# Patient Record
Sex: FEMALE | Race: WHITE | NOT HISPANIC OR LATINO | Employment: FULL TIME | URBAN - METROPOLITAN AREA
[De-identification: names, ages, dates, MRNs, and addresses within clinical notes are randomized per-mention and may not be internally consistent; named-entity substitution may affect disease eponyms.]

---

## 2018-07-04 ENCOUNTER — HOSPITAL ENCOUNTER (OUTPATIENT)
Dept: RADIOLOGY | Facility: MEDICAL CENTER | Age: 28
End: 2018-07-04

## 2018-07-04 ENCOUNTER — HOSPITAL ENCOUNTER (INPATIENT)
Facility: MEDICAL CENTER | Age: 28
LOS: 1 days | DRG: 153 | End: 2018-07-05
Attending: EMERGENCY MEDICINE | Admitting: INTERNAL MEDICINE
Payer: COMMERCIAL

## 2018-07-04 ENCOUNTER — APPOINTMENT (OUTPATIENT)
Dept: RADIOLOGY | Facility: MEDICAL CENTER | Age: 28
DRG: 153 | End: 2018-07-04
Attending: INTERNAL MEDICINE
Payer: COMMERCIAL

## 2018-07-04 PROBLEM — J36 PERITONSILLAR ABSCESS: Status: ACTIVE | Noted: 2018-07-04

## 2018-07-04 PROCEDURE — 99285 EMERGENCY DEPT VISIT HI MDM: CPT

## 2018-07-04 PROCEDURE — 700105 HCHG RX REV CODE 258: Performed by: EMERGENCY MEDICINE

## 2018-07-04 PROCEDURE — G0378 HOSPITAL OBSERVATION PER HR: HCPCS

## 2018-07-04 PROCEDURE — 700111 HCHG RX REV CODE 636 W/ 250 OVERRIDE (IP): Performed by: INTERNAL MEDICINE

## 2018-07-04 PROCEDURE — A9270 NON-COVERED ITEM OR SERVICE: HCPCS | Performed by: INTERNAL MEDICINE

## 2018-07-04 PROCEDURE — 36415 COLL VENOUS BLD VENIPUNCTURE: CPT

## 2018-07-04 PROCEDURE — 87040 BLOOD CULTURE FOR BACTERIA: CPT | Mod: 91

## 2018-07-04 PROCEDURE — 700111 HCHG RX REV CODE 636 W/ 250 OVERRIDE (IP): Performed by: EMERGENCY MEDICINE

## 2018-07-04 PROCEDURE — 10160 PNXR ASPIR ABSC HMTMA BULLA: CPT

## 2018-07-04 PROCEDURE — 96375 TX/PRO/DX INJ NEW DRUG ADDON: CPT

## 2018-07-04 PROCEDURE — 71045 X-RAY EXAM CHEST 1 VIEW: CPT

## 2018-07-04 PROCEDURE — 700101 HCHG RX REV CODE 250: Performed by: INTERNAL MEDICINE

## 2018-07-04 PROCEDURE — 700105 HCHG RX REV CODE 258: Performed by: INTERNAL MEDICINE

## 2018-07-04 PROCEDURE — 96374 THER/PROPH/DIAG INJ IV PUSH: CPT

## 2018-07-04 PROCEDURE — 700102 HCHG RX REV CODE 250 W/ 637 OVERRIDE(OP): Performed by: INTERNAL MEDICINE

## 2018-07-04 RX ORDER — CLINDAMYCIN PHOSPHATE 600 MG/50ML
600 INJECTION, SOLUTION INTRAVENOUS EVERY 8 HOURS
Status: DISCONTINUED | OUTPATIENT
Start: 2018-07-04 | End: 2018-07-05

## 2018-07-04 RX ORDER — IBUPROFEN 200 MG
800 TABLET ORAL EVERY 6 HOURS PRN
Status: ON HOLD | COMMUNITY
End: 2018-07-05

## 2018-07-04 RX ORDER — POLYETHYLENE GLYCOL 3350 17 G/17G
1 POWDER, FOR SOLUTION ORAL
Status: DISCONTINUED | OUTPATIENT
Start: 2018-07-04 | End: 2018-07-05 | Stop reason: HOSPADM

## 2018-07-04 RX ORDER — BISACODYL 10 MG
10 SUPPOSITORY, RECTAL RECTAL
Status: DISCONTINUED | OUTPATIENT
Start: 2018-07-04 | End: 2018-07-05 | Stop reason: HOSPADM

## 2018-07-04 RX ORDER — DEXAMETHASONE SODIUM PHOSPHATE 10 MG/ML
10 INJECTION, SOLUTION INTRAMUSCULAR; INTRAVENOUS ONCE
Status: COMPLETED | OUTPATIENT
Start: 2018-07-04 | End: 2018-07-04

## 2018-07-04 RX ORDER — SODIUM CHLORIDE 9 MG/ML
1000 INJECTION, SOLUTION INTRAVENOUS ONCE
Status: COMPLETED | OUTPATIENT
Start: 2018-07-04 | End: 2018-07-04

## 2018-07-04 RX ORDER — AMOXICILLIN 250 MG
2 CAPSULE ORAL 2 TIMES DAILY
Status: DISCONTINUED | OUTPATIENT
Start: 2018-07-04 | End: 2018-07-05 | Stop reason: HOSPADM

## 2018-07-04 RX ORDER — ONDANSETRON 4 MG/1
4 TABLET, ORALLY DISINTEGRATING ORAL EVERY 4 HOURS PRN
Status: DISCONTINUED | OUTPATIENT
Start: 2018-07-04 | End: 2018-07-05 | Stop reason: HOSPADM

## 2018-07-04 RX ORDER — PROMETHAZINE HYDROCHLORIDE 25 MG/1
12.5-25 TABLET ORAL EVERY 4 HOURS PRN
Status: DISCONTINUED | OUTPATIENT
Start: 2018-07-04 | End: 2018-07-05 | Stop reason: HOSPADM

## 2018-07-04 RX ORDER — AZITHROMYCIN 250 MG/1
250-500 TABLET, FILM COATED ORAL DAILY
Status: ON HOLD | COMMUNITY
Start: 2018-07-02 | End: 2018-07-05

## 2018-07-04 RX ORDER — MORPHINE SULFATE 4 MG/ML
4 INJECTION, SOLUTION INTRAMUSCULAR; INTRAVENOUS ONCE
Status: COMPLETED | OUTPATIENT
Start: 2018-07-04 | End: 2018-07-04

## 2018-07-04 RX ORDER — ACETAMINOPHEN 325 MG/1
650 TABLET ORAL EVERY 6 HOURS PRN
COMMUNITY

## 2018-07-04 RX ORDER — MORPHINE SULFATE 4 MG/ML
2 INJECTION, SOLUTION INTRAMUSCULAR; INTRAVENOUS
Status: DISCONTINUED | OUTPATIENT
Start: 2018-07-04 | End: 2018-07-05 | Stop reason: HOSPADM

## 2018-07-04 RX ORDER — PROMETHAZINE HYDROCHLORIDE 25 MG/1
12.5-25 SUPPOSITORY RECTAL EVERY 4 HOURS PRN
Status: DISCONTINUED | OUTPATIENT
Start: 2018-07-04 | End: 2018-07-05 | Stop reason: HOSPADM

## 2018-07-04 RX ORDER — DEXAMETHASONE SODIUM PHOSPHATE 4 MG/ML
10 INJECTION, SOLUTION INTRA-ARTICULAR; INTRALESIONAL; INTRAMUSCULAR; INTRAVENOUS; SOFT TISSUE EVERY 8 HOURS
Status: DISCONTINUED | OUTPATIENT
Start: 2018-07-04 | End: 2018-07-05

## 2018-07-04 RX ORDER — SODIUM CHLORIDE 9 MG/ML
INJECTION, SOLUTION INTRAVENOUS CONTINUOUS
Status: DISCONTINUED | OUTPATIENT
Start: 2018-07-04 | End: 2018-07-05 | Stop reason: HOSPADM

## 2018-07-04 RX ORDER — CLINDAMYCIN HYDROCHLORIDE 150 MG/1
150 CAPSULE ORAL 4 TIMES DAILY
Status: ON HOLD | COMMUNITY
Start: 2018-07-02 | End: 2018-07-05

## 2018-07-04 RX ORDER — ONDANSETRON 2 MG/ML
4 INJECTION INTRAMUSCULAR; INTRAVENOUS EVERY 4 HOURS PRN
Status: DISCONTINUED | OUTPATIENT
Start: 2018-07-04 | End: 2018-07-05 | Stop reason: HOSPADM

## 2018-07-04 RX ORDER — ALBUTEROL SULFATE 90 UG/1
2 AEROSOL, METERED RESPIRATORY (INHALATION) EVERY 6 HOURS PRN
Status: ON HOLD | COMMUNITY
End: 2018-07-05

## 2018-07-04 RX ORDER — ONDANSETRON 2 MG/ML
4 INJECTION INTRAMUSCULAR; INTRAVENOUS ONCE
Status: COMPLETED | OUTPATIENT
Start: 2018-07-04 | End: 2018-07-04

## 2018-07-04 RX ADMIN — ONDANSETRON 4 MG: 2 INJECTION, SOLUTION INTRAMUSCULAR; INTRAVENOUS at 14:31

## 2018-07-04 RX ADMIN — DEXAMETHASONE SODIUM PHOSPHATE 10 MG: 4 INJECTION, SOLUTION INTRAMUSCULAR; INTRAVENOUS at 22:22

## 2018-07-04 RX ADMIN — SODIUM CHLORIDE 1000 ML: 9 INJECTION, SOLUTION INTRAVENOUS at 14:30

## 2018-07-04 RX ADMIN — MORPHINE SULFATE 4 MG: 4 INJECTION INTRAVENOUS at 14:30

## 2018-07-04 RX ADMIN — MORPHINE SULFATE 2 MG: 4 INJECTION INTRAVENOUS at 22:21

## 2018-07-04 RX ADMIN — ONDANSETRON 4 MG: 2 INJECTION, SOLUTION INTRAMUSCULAR; INTRAVENOUS at 22:25

## 2018-07-04 RX ADMIN — DEXAMETHASONE SODIUM PHOSPHATE 10 MG: 10 INJECTION, SOLUTION INTRAMUSCULAR; INTRAVENOUS at 14:30

## 2018-07-04 RX ADMIN — CLINDAMYCIN IN 5 PERCENT DEXTROSE 600 MG: 12 INJECTION, SOLUTION INTRAVENOUS at 20:57

## 2018-07-04 RX ADMIN — SODIUM CHLORIDE: 9 INJECTION, SOLUTION INTRAVENOUS at 17:52

## 2018-07-04 RX ADMIN — STANDARDIZED SENNA CONCENTRATE AND DOCUSATE SODIUM 2 TABLET: 8.6; 5 TABLET, FILM COATED ORAL at 20:57

## 2018-07-04 ASSESSMENT — COPD QUESTIONNAIRES
IN THE PAST 12 MONTHS DO YOU DO LESS THAN YOU USED TO BECAUSE OF YOUR BREATHING PROBLEMS: DISAGREE/UNSURE
DO YOU EVER COUGH UP ANY MUCUS OR PHLEGM?: NO/ONLY WITH OCCASIONAL COLDS OR INFECTIONS
HAVE YOU SMOKED AT LEAST 100 CIGARETTES IN YOUR ENTIRE LIFE: NO/DON'T KNOW
DURING THE PAST 4 WEEKS HOW MUCH DID YOU FEEL SHORT OF BREATH: NONE/LITTLE OF THE TIME
COPD SCREENING SCORE: 0

## 2018-07-04 ASSESSMENT — ENCOUNTER SYMPTOMS
NEUROLOGICAL NEGATIVE: 1
MUSCULOSKELETAL NEGATIVE: 1
STRIDOR: 0
SORE THROAT: 1
DIAPHORESIS: 0
CARDIOVASCULAR NEGATIVE: 1
WHEEZING: 0
CHILLS: 1
DIARRHEA: 0
ABDOMINAL PAIN: 0
VOMITING: 0
NAUSEA: 0
PSYCHIATRIC NEGATIVE: 1
FEVER: 0
SHORTNESS OF BREATH: 0
EYE REDNESS: 0
COUGH: 1
EYE DISCHARGE: 0
SINUS PAIN: 0

## 2018-07-04 ASSESSMENT — COGNITIVE AND FUNCTIONAL STATUS - GENERAL
MOBILITY SCORE: 24
DAILY ACTIVITIY SCORE: 24
SUGGESTED CMS G CODE MODIFIER DAILY ACTIVITY: CH
SUGGESTED CMS G CODE MODIFIER MOBILITY: CH

## 2018-07-04 ASSESSMENT — PATIENT HEALTH QUESTIONNAIRE - PHQ9
SUM OF ALL RESPONSES TO PHQ9 QUESTIONS 1 AND 2: 0
2. FEELING DOWN, DEPRESSED, IRRITABLE, OR HOPELESS: NOT AT ALL
1. LITTLE INTEREST OR PLEASURE IN DOING THINGS: NOT AT ALL

## 2018-07-04 ASSESSMENT — PAIN SCALES - GENERAL: PAINLEVEL_OUTOF10: 4

## 2018-07-04 ASSESSMENT — LIFESTYLE VARIABLES: EVER_SMOKED: NEVER

## 2018-07-04 NOTE — ED NOTES
Break RN: Patient awaiting ENT Consult. Updated POC with patient and family. ERP ok patient to breast feed. VSS at this time.

## 2018-07-04 NOTE — ED PROVIDER NOTES
ED Provider Note    Scribed for Dr. Matias Carrasco M.D. by Margaret Archer. 7/4/2018  12:57 PM    Primary care provider: ORTIZ Maravilla M.D.  Means of arrival: EMS  History obtained from: Patient   History limited by: none    CHIEF COMPLAINT  Chief Complaint   Patient presents with   • Oral Swelling     transferred from [place] for peritonsillar abcess    • Difficulty Swallowing       HPI  Jeanne Juarez is a 27 y.o. female who presents to the Emergency Department by EMS from [facility] for peritonsillar abscess. Patient had a CT preformed today which revealed the peritonsillar abscess. She was treated with 600mg Clindamycin, 15mg Toradol, and 1L NS fluid at outside hospital. Patient has been experiencing a sore throat for two weeks. Her symptoms are exacerbated with swallowing. Patient denies alleviating factors. She began her course of antibiotics three days ago. She is unable to tolerate oral secretions at this time. Patient denies any fever, vomiting, or abdominal pain associated. She has allergies to Amoxicillin, Penicillin, Keflex, and Cephalexin. The patient denies any past pertinent medical history, and use of daily medications.        REVIEW OF SYSTEMS  Pertinent positives include peritonsillar abscess, unable to tolerate oral secretions, sore throat. Pertinent negatives include no  fever, vomiting, or abdominal pain . As above, all other systems reviewed and are negative.   See HPI for further details.   C.    PAST MEDICAL HISTORY    denies any past pertinent medical history    SURGICAL HISTORY  patient denies any surgical history    SOCIAL HISTORY  Social History   Substance Use Topics   • Smoking status: Not on file   • Smokeless tobacco: Not on file   • Alcohol use Not on file      History   Drug use: Unknown       FAMILY HISTORY  No family history noted    CURRENT MEDICATIONS  Clindamycin     ALLERGIES  Allergies   Allergen Reactions   • Amoxicillin    • Cephalexin    • Keflex    •  "Penicillins        PHYSICAL EXAM  VITAL SIGNS: /74   Pulse 67   Temp 37.2 °C (98.9 °F)   Resp 15   Ht 1.727 m (5' 8\")   Wt 54.5 kg (120 lb 4.2 oz)   SpO2 96%   BMI 18.29 kg/m²     Constitutional: Well developed, Well nourished, mild distress secondary to pain, Non-toxic appearance. Unable to tolerate oral secretions.   HENT: Normocephalic, Atraumatic, Bilateral external ears normal, Oropharynx is dry, left sided swelling, she is unable to open her mouth very wide and I am unable to see direction of abscess.   Eyes: PERRLA, EOMI, Conjunctiva normal, No discharge.   Neck: No tenderness, Supple, No stridor.   Lymphatic: No lymphadenopathy noted.   Cardiovascular: Normal heart rate, Normal rhythm.   Thorax & Lungs: Clear to auscultation bilaterally, No respiratory distress, No wheezing, No crackles.   Abdomen: Soft, No tenderness, No masses, No pulsatile masses.   Skin: Warm, Dry, No erythema, No rash.   Extremities:, No edema No cyanosis.   Musculoskeletal: No tenderness to palpation or major deformities noted.  Intact distal pulses  Neurologic: Awake, alert. Moves all extremities spontaneously.  Psychiatric: Affect normal, Judgment normal, Mood normal.       RADIOLOGY  CT-FOREIGN FILM CAT SCAN   Final Result        The radiologist's interpretation of all radiological studies have been reviewed by me.      COURSE & MEDICAL DECISION MAKING  Pertinent Labs & Imaging studies reviewed. (See chart for details)    12:57 PM - Patient seen and examined at bedside. I discussed with patient that I will consult with ENT for further plan of treatment.     1:05 PM Paged ENT.     1:46 PM Recheck: Patient re-evaluated at beside. Her symptoms are unchanged at this time.     2:04 PM - I discussed the patient's case and the above findings with Dr. Meng (ENT) who agrees to consult.     2:22 PM - I discussed the patient's case and the above findings with pharmacy who advises I treat with 10mg decadron and IV fluids for dry " mucus membranes .    3:28 PM Recheck: Patient re-evaluated at beside. Patient reports feeling improved after IV fluids, her mucus membranes have improved. Discussed patient's condition and treatment plan including incision and drainage which she agrees to.     3:32 PM I was unable to obtain drainage other than blood from abscess. Paged ENT.     3:41 PM - I discussed the patient's case and the above findings with Dr. Meng (ENT) who ***          Decision Making:  ***    {.EMSSDCNOTE or .EMSSADMITNOTE}    FINAL IMPRESSION  No diagnosis found.      IMargaret (Scribe), am scribing for, and in the presence of, Matias Carrasco M.D..    Electronically signed by: Margaret Archer (Scribe), 7/4/2018    Matias COLLIER M.D. personally performed the services described in this documentation, as scribed by Margaret Archer in my presence, and it is both accurate and complete.    {ERP Attestation (ERP ONLY):200109}

## 2018-07-04 NOTE — H&P
"      Internal Medicine Admitting History and Physical    Note Author: Madyson Merino M.D.       Name Jeanne Juarez 1990   Age/Sex 27 y.o. female   MRN [id number]   Code Status Full     After 5PM or if no immediate response to page, please call for cross-coverage  Attending/Team: Dr. Luna/BRITANY REd See Patient List for primary contact information  Call [phone] to page    1st Call - Day Intern (R1):   Dr Merino 2nd Call - Day Sr. Resident (R2/R3):   Dr Cedillo       Chief Complaint:   Tonsillar abcess    HPI:  Pt starting have cold symptoms two weeks ago with sinus congestion and sore throat. She was not improving so Monday of this week, pt went to urgent care in [place] and was prescribed \"two Z packs\" on Monday and then \"one Z pack\" Tuesday morning. She did not continue abx after Tuesday. She was not improving so went to [facility] in [place]. Received IV clindamycin in ER but was not discharged on oral abx. Pt felt her throat pain was worsening and had difficulty swallowing. Three days ago pt felt significantly worse and felt like she had difficulty breathing so returned to ER in [place] and got another dose of IV clindamycin. Pt was discharged from ER and told she had cellulitis and pt decided to drive herself to [facility]. Pt endorses sporadic chills, dysphagia, cough, and hoarseness. Denies sick contacts, fever, diaphoresis, and diarrhea. Pt is fully vaccinated.     She has had two oral abscesses in the past, both within the past two years. Pt was very ill with one and had to be hospitalized in [place] due to having difficulty breathing from compression of her airway. She was given steroids and two antibiotics but she cannot recall which.     In ER pt was given 1 L NS, one dose decadron, and was started on clindamycin. In ER a left tonsillar abscess was visualized and they attempted to drain it. Unsure if it produced any pus.     Review of Systems "   Constitutional: Positive for chills and malaise/fatigue. Negative for diaphoresis and fever.   HENT: Positive for sore throat. Negative for sinus pain.    Eyes: Negative for discharge and redness.   Respiratory: Positive for cough. Negative for shortness of breath, wheezing and stridor.    Cardiovascular: Negative.    Gastrointestinal: Negative for abdominal pain, diarrhea, nausea and vomiting.   Genitourinary: Negative.    Musculoskeletal: Negative.    Skin: Negative for rash.   Neurological: Negative.    Endo/Heme/Allergies: Negative.    Psychiatric/Behavioral: Negative.              Past Medical History (Chronic medical problem, known complications and current treatment)    Dental abscesses x2     Past Surgical History:  Past Surgical History:   Procedure Laterality Date   • TUBAL LIGATION  2017       Current Outpatient Medications:  Home Medications     Reviewed by Prerna Sands (Pharmacy Tech) on 07/04/18 at 1634  Med List Status: Complete   Medication Last Dose Status   acetaminophen (TYLENOL) 500 MG Tab > 2 days Active   albuterol 108 (90 Base) MCG/ACT Aero Soln inhalation aerosol 7/1/2018 Active   azithromycin (ZITHROMAX) 250 MG Tab 7/4/2018 Active   clindamycin (CLEOCIN) 150 MG Cap 7/4/2018 Active   ibuprofen (MOTRIN) 200 MG Tab 7/4/2018 Active   Non Formulary Request > 2 days Active   Non Formulary Request > 2 days Active   Non Formulary Request > 2 days Active                Medication Allergy/Sensitivities:  Allergies   Allergen Reactions   • Amoxicillin    • Cephalexin    • Keflex    • Penicillins          Family History (mandatory)   Family History   Problem Relation Age of Onset   • Breast Cancer Maternal Grandmother    • Diabetes Maternal Grandfather    • Cancer Paternal Grandfather        Social History (mandatory)   Social History     Social History   • Marital status: Single     Spouse name: N/A   • Number of children: N/A   • Years of education: N/A     Occupational History   • Not on  "file.     Social History Main Topics   • Smoking status: Not on file   • Smokeless tobacco: Not on file   • Alcohol use Not on file   • Drug use: Unknown   • Sexual activity: Not on file     Other Topics Concern   • Not on file     Social History Narrative   • No narrative on file     Living situation: Lives with three children and her children's father in [place]  PCP : ORTIZ Maravilla M.D.    Physical Exam     Vitals:    07/04/18 1400 07/04/18 1430 07/04/18 1526 07/04/18 1600   BP:       Pulse: 73 81 92 89   Resp:       Temp:       SpO2: 98% 97% 96% 94%   Weight:       Height:         Body mass index is 18.29 kg/m².  /74   Pulse 89   Temp 37.2 °C (98.9 °F)   Resp 15   Ht 1.727 m (5' 8\")   Wt 54.5 kg (120 lb 4.2 oz)   SpO2 94%   BMI 18.29 kg/m²   O2 therapy: Pulse Oximetry: 94 %    Physical Exam   Constitutional: She is oriented to person, place, and time and well-developed, well-nourished, and in no distress.   Bag with blood that pt is spitting into on bed   HENT:   Head: Normocephalic and atraumatic.   Swelling of the left submandibular area, tenderness, no erythema of the skin  Left tonsil is erythematous and bleeding  mallampati 3  No LAD  No sinus tenderness   Neck: Neck supple. No tracheal deviation present.   Cardiovascular: Normal rate, regular rhythm and normal heart sounds.    Pulmonary/Chest: Effort normal. No stridor. No respiratory distress. She has no wheezes. She has no rales.   Decreased breath sounds RLL   Abdominal: Soft. Bowel sounds are normal. She exhibits no distension. There is no tenderness.   Musculoskeletal:   Capillary refill +2   Lymphadenopathy:     She has no cervical adenopathy.   Neurological: She is alert and oriented to person, place, and time. GCS score is 15.   Skin: Skin is warm and dry.   Nursing note and vitals reviewed.        Data Review       Old Records Request:  Deffered  Current Records review/summary: No current records    Lab Data Review:  No " results found for this or any previous visit (from the past 24 hour(s)).    Imaging/Procedures Review:    Independant Imaging Review: completed  CT-FOREIGN FILM CAT SCAN   Final Result      DX-CHEST-PORTABLE (1 VIEW)    (Results Pending)            EKG:   EKG Independant Review: no EKG    Records reviewed and summarized in current documentation : No records available  [employer] teaching service handout given to patient: Yes         Assessment/Plan     Peritonsillar abscess   Assessment & Plan    -pain for two weeks  -previously treated with azithromycin, unknown amount, possibly two days  -prevsiously treated with two IV doses of clindamycin, last this AM in [place] [facility]  -no issues breathing currently, pain and difficulty swallowing per pt    Plan:  -started decadron q 8 hrs  -started clindamycin q 8 hrs  - IV fluids 100 ml/hr  -liquid diet  -ENT has been contacted            Anticipated Hospital stay: 1 day        Quality Measures  Quality-Core Measures  PCP: ORTIZ Maravilla M.D.

## 2018-07-04 NOTE — ED TRIAGE NOTES
Seen at [place] this morning, CT shows peritonsillar abscess.  Patient received 600mg clindamycin, 15mg toradol, and 1L NS.  Vitals stable, maintaining airway.  Unable to swallow at this time.

## 2018-07-04 NOTE — ED PROVIDER NOTES
ED Provider Note    Scribed for Dr. Matias Carrasco M.D. by Margaret Archer. 7/4/2018  12:57 PM    Primary care provider: ORTIZ Maravilla M.D.  Means of arrival: EMS  History obtained from: Patient   History limited by: none    CHIEF COMPLAINT  Chief Complaint   Patient presents with   • Oral Swelling     transferred from [place] for peritonsillar abcess    • Difficulty Swallowing       HPI  Jeanne Juarez is a 27 y.o. female who presents to the Emergency Department by EMS from [facility] for peritonsillar abscess. Patient had a CT preformed today which revealed the peritonsillar abscess. She was treated with 600mg Clindamycin, 15mg Toradol, and 1L NS fluid at outside hospital. Patient has been experiencing a sore throat for two weeks. Her symptoms are exacerbated with swallowing. Patient denies alleviating factors. She began her course of antibiotics three days ago. She is unable to tolerate oral secretions at this time. Patient denies any fever, vomiting, or abdominal pain associated. She has allergies to Amoxicillin, Penicillin, Keflex, and Cephalexin. The patient denies any past pertinent medical history, and use of daily medications.        REVIEW OF SYSTEMS  Pertinent positives include peritonsillar abscess, unable to tolerate oral secretions, sore throat. Pertinent negatives include no  fever, vomiting, or abdominal pain . As above, all other systems reviewed and are negative.   See HPI for further details.   C.    PAST MEDICAL HISTORY   has a past medical history of Dental abscess (2016); Hip fracture (HCC); and Pregnancy. denies any past pertinent medical history    SURGICAL HISTORY   has a past surgical history that includes tubal ligation (2017).    SOCIAL HISTORY     No social history noted.     FAMILY HISTORY  No family history noted    CURRENT MEDICATIONS  Clindamycin     ALLERGIES  Allergies   Allergen Reactions   • Amoxicillin    • Cephalexin    • Keflex    • Penicillins   "      PHYSICAL EXAM  VITAL SIGNS: /74   Pulse 67   Temp 37.2 °C (98.9 °F)   Resp 15   Ht 1.727 m (5' 8\")   Wt 54.5 kg (120 lb 4.2 oz)   SpO2 96%   BMI 18.29 kg/m²     Constitutional: Well developed, Well nourished, mild distress secondary to pain, Non-toxic appearance. Unable to tolerate oral secretions.   HENT: Normocephalic, Atraumatic, Bilateral external ears normal, Oropharynx is dry, left sided swelling, she is unable to open her mouth very wide and I am unable to see direction of abscess. After some pain medication I can see much better she is cooperative and attempted drainage as below  Eyes: PERRLA, EOMI, Conjunctiva normal, No discharge.   Neck: No tenderness, Supple, No stridor.   Lymphatic: No lymphadenopathy noted.   Cardiovascular: Normal heart rate, Normal rhythm.   Thorax & Lungs: Clear to auscultation bilaterally, No respiratory distress, No wheezing, No crackles.   Abdomen: Soft, No tenderness, No masses, No pulsatile masses.   Skin: Warm, Dry, No erythema, No rash.   Extremities:, No edema No cyanosis.   Musculoskeletal: No tenderness to palpation or major deformities noted.  Intact distal pulses  Neurologic: Awake, alert. Moves all extremities spontaneously.  Psychiatric: Affect normal, Judgment normal, Mood normal.       RADIOLOGY  DX-CHEST-PORTABLE (1 VIEW)   Final Result      No acute cardiopulmonary disease.      CT-FOREIGN FILM CAT SCAN   Final Result        The radiologist's interpretation of all radiological studies have been reviewed by me.  Procedure note: The patient more cooperative after pain medication unable to open her mouth wider I did attempt aspiration of peritonsillar abscess although did not obtain any purulent material there was some bleeding and the patient feels significantly improved after drainage attempt             COURSE & MEDICAL DECISION MAKING  Pertinent Labs & Imaging studies reviewed. (See chart for details)    12:57 PM - Patient seen and examined at " bedside. I discussed with patient that I will consult with ENT for further plan of treatment.     1:05 PM Paged ENT.     1:46 PM Recheck: Patient re-evaluated at [facility]. Her symptoms are unchanged at this time.     2:04 PM - I discussed the patient's case and the above findings with Dr. Meng (ENT)      2:22 PM - I discussed the patient's case and the above findings with pharmacy who advises I treat with 10mg decadron and IV fluids for dry mucus membranes .    3:28 PM Recheck: Patient re-evaluated at [facility]. Patient reports feeling improved after IV fluids, her mucus membranes have improved. Discussed patient's condition and treatment plan including incision and drainage which she agrees to.     3:32 PM I was unable to obtain drainage other than blood from abscess. Paged ENT.     3:40 PM - I discussed the patient's case and the above findings with Dr. Meng (ENT) who advises I admit patient to hospital service.    3:43 PM Recheck: Patient re-evaluated at [facility]. Patient reports feeling improved, she states she feels less pressure secondary to the abscess. Discussed patient's condition and treatment plan. Patient's lab and radiology results discussed. The patient understood and is in agreement.        3:50 PM - I discussed the patient's case and the above findings with [facility] who agrees to admit the patient.               Decision Making:  Although I was unable to obtain pus the patient has improve after attempted aspiration of peritonsillar abscess will admit her for observation to medicine and keep on IV antibiotics and steroids    DISPOSITION:  Patient will be admitted to [facility] in guarded condition.      FINAL IMPRESSION  Peritonsillar abscess and cellulitis      Margaret COLLIER (Darlene), am scribing for, and in the presence of, Matias Carrasco M.D..    Electronically signed by: Margaret Archer (Darlene), 7/4/2018    Matias COLLIER M.D. personally performed the  services described in this documentation, as scribed by Margaret Archer in my presence, and it is both accurate and complete.    The note accurately reflects work and decisions made by me.  Matias Carrasco  7/4/2018  7:16 PM

## 2018-07-04 NOTE — ED NOTES
Med rec updated and complete  Allergies reviewed  Interviewed pt with family at bedside with permission from pt.

## 2018-07-05 ENCOUNTER — APPOINTMENT (OUTPATIENT)
Dept: RADIOLOGY | Facility: MEDICAL CENTER | Age: 28
DRG: 153 | End: 2018-07-05
Attending: INTERNAL MEDICINE
Payer: COMMERCIAL

## 2018-07-05 VITALS
WEIGHT: 135.8 LBS | HEART RATE: 69 BPM | BODY MASS INDEX: 20.58 KG/M2 | RESPIRATION RATE: 17 BRPM | TEMPERATURE: 98.2 F | HEIGHT: 68 IN | SYSTOLIC BLOOD PRESSURE: 104 MMHG | DIASTOLIC BLOOD PRESSURE: 59 MMHG | OXYGEN SATURATION: 94 %

## 2018-07-05 PROBLEM — R73.01 IMPAIRED FASTING GLUCOSE: Status: ACTIVE | Noted: 2018-07-05

## 2018-07-05 PROBLEM — J39.9 DISORDER OF UPPER AIRWAY: Status: ACTIVE | Noted: 2018-07-05

## 2018-07-05 LAB
ALBUMIN SERPL BCP-MCNC: 3.9 G/DL (ref 3.2–4.9)
ALBUMIN/GLOB SERPL: 1.5 G/DL
ALP SERPL-CCNC: 63 U/L (ref 30–99)
ALT SERPL-CCNC: 9 U/L (ref 2–50)
ANION GAP SERPL CALC-SCNC: 10 MMOL/L (ref 0–11.9)
AST SERPL-CCNC: 10 U/L (ref 12–45)
BASOPHILS # BLD AUTO: 0.2 % (ref 0–1.8)
BASOPHILS # BLD: 0.01 K/UL (ref 0–0.12)
BILIRUB SERPL-MCNC: 0.4 MG/DL (ref 0.1–1.5)
BUN SERPL-MCNC: 11 MG/DL (ref 8–22)
CALCIUM SERPL-MCNC: 9.1 MG/DL (ref 8.5–10.5)
CHLORIDE SERPL-SCNC: 109 MMOL/L (ref 96–112)
CO2 SERPL-SCNC: 20 MMOL/L (ref 20–33)
CREAT SERPL-MCNC: 0.46 MG/DL (ref 0.5–1.4)
EOSINOPHIL # BLD AUTO: 0 K/UL (ref 0–0.51)
EOSINOPHIL NFR BLD: 0 % (ref 0–6.9)
ERYTHROCYTE [DISTWIDTH] IN BLOOD BY AUTOMATED COUNT: 41.9 FL (ref 35.9–50)
GLOBULIN SER CALC-MCNC: 2.6 G/DL (ref 1.9–3.5)
GLUCOSE SERPL-MCNC: 115 MG/DL (ref 65–99)
HCT VFR BLD AUTO: 35.4 % (ref 37–47)
HGB BLD-MCNC: 11.4 G/DL (ref 12–16)
IMM GRANULOCYTES # BLD AUTO: 0.02 K/UL (ref 0–0.11)
IMM GRANULOCYTES NFR BLD AUTO: 0.4 % (ref 0–0.9)
LYMPHOCYTES # BLD AUTO: 0.88 K/UL (ref 1–4.8)
LYMPHOCYTES NFR BLD: 16.1 % (ref 22–41)
MAGNESIUM SERPL-MCNC: 1.9 MG/DL (ref 1.5–2.5)
MCH RBC QN AUTO: 28.9 PG (ref 27–33)
MCHC RBC AUTO-ENTMCNC: 32.2 G/DL (ref 33.6–35)
MCV RBC AUTO: 89.6 FL (ref 81.4–97.8)
MONOCYTES # BLD AUTO: 0.13 K/UL (ref 0–0.85)
MONOCYTES NFR BLD AUTO: 2.4 % (ref 0–13.4)
NEUTROPHILS # BLD AUTO: 4.44 K/UL (ref 2–7.15)
NEUTROPHILS NFR BLD: 80.9 % (ref 44–72)
NRBC # BLD AUTO: 0 K/UL
NRBC BLD-RTO: 0 /100 WBC
PLATELET # BLD AUTO: 217 K/UL (ref 164–446)
PMV BLD AUTO: 10.5 FL (ref 9–12.9)
POTASSIUM SERPL-SCNC: 4.1 MMOL/L (ref 3.6–5.5)
PROT SERPL-MCNC: 6.5 G/DL (ref 6–8.2)
RBC # BLD AUTO: 3.95 M/UL (ref 4.2–5.4)
SODIUM SERPL-SCNC: 139 MMOL/L (ref 135–145)
WBC # BLD AUTO: 5.5 K/UL (ref 4.8–10.8)

## 2018-07-05 PROCEDURE — 700111 HCHG RX REV CODE 636 W/ 250 OVERRIDE (IP): Performed by: INTERNAL MEDICINE

## 2018-07-05 PROCEDURE — 83735 ASSAY OF MAGNESIUM: CPT

## 2018-07-05 PROCEDURE — G0378 HOSPITAL OBSERVATION PER HR: HCPCS

## 2018-07-05 PROCEDURE — 85025 COMPLETE CBC W/AUTO DIFF WBC: CPT

## 2018-07-05 PROCEDURE — 93971 EXTREMITY STUDY: CPT

## 2018-07-05 PROCEDURE — 80053 COMPREHEN METABOLIC PANEL: CPT

## 2018-07-05 PROCEDURE — 36415 COLL VENOUS BLD VENIPUNCTURE: CPT

## 2018-07-05 PROCEDURE — 700101 HCHG RX REV CODE 250: Performed by: INTERNAL MEDICINE

## 2018-07-05 PROCEDURE — 770006 HCHG ROOM/CARE - MED/SURG/GYN SEMI*

## 2018-07-05 PROCEDURE — 99223 1ST HOSP IP/OBS HIGH 75: CPT | Mod: GC | Performed by: INTERNAL MEDICINE

## 2018-07-05 PROCEDURE — 700105 HCHG RX REV CODE 258: Performed by: INTERNAL MEDICINE

## 2018-07-05 PROCEDURE — 700101 HCHG RX REV CODE 250: Performed by: STUDENT IN AN ORGANIZED HEALTH CARE EDUCATION/TRAINING PROGRAM

## 2018-07-05 RX ORDER — METHYLPREDNISOLONE 4 MG/1
TABLET ORAL
Qty: 1 KIT | Refills: 0 | Status: SHIPPED | OUTPATIENT
Start: 2018-07-05 | End: 2023-05-13

## 2018-07-05 RX ORDER — METHYLPREDNISOLONE 4 MG/1
4 TABLET ORAL
Status: DISCONTINUED | OUTPATIENT
Start: 2018-07-05 | End: 2018-07-05 | Stop reason: HOSPADM

## 2018-07-05 RX ORDER — METHYLPREDNISOLONE 4 MG/1
8 TABLET ORAL
Status: DISCONTINUED | OUTPATIENT
Start: 2018-07-06 | End: 2018-07-05 | Stop reason: HOSPADM

## 2018-07-05 RX ORDER — CLINDAMYCIN PHOSPHATE 900 MG/50ML
900 INJECTION, SOLUTION INTRAVENOUS EVERY 8 HOURS
Status: DISCONTINUED | OUTPATIENT
Start: 2018-07-05 | End: 2018-07-05

## 2018-07-05 RX ORDER — METHYLPREDNISOLONE 4 MG/1
4 TABLET ORAL
Status: DISCONTINUED | OUTPATIENT
Start: 2018-07-06 | End: 2018-07-05 | Stop reason: HOSPADM

## 2018-07-05 RX ORDER — LIDOCAINE HYDROCHLORIDE AND EPINEPHRINE 10; 10 MG/ML; UG/ML
10 INJECTION, SOLUTION INFILTRATION; PERINEURAL ONCE
Status: DISCONTINUED | OUTPATIENT
Start: 2018-07-05 | End: 2018-07-05 | Stop reason: HOSPADM

## 2018-07-05 RX ORDER — LIDOCAINE HYDROCHLORIDE AND EPINEPHRINE 10; 10 MG/ML; UG/ML
10 INJECTION, SOLUTION INFILTRATION; PERINEURAL ONCE
Status: DISCONTINUED | OUTPATIENT
Start: 2018-07-05 | End: 2018-07-05

## 2018-07-05 RX ORDER — CLINDAMYCIN HYDROCHLORIDE 150 MG/1
150 CAPSULE ORAL 3 TIMES DAILY
Qty: 30 CAP | Refills: 0 | Status: SHIPPED | OUTPATIENT
Start: 2018-07-05 | End: 2023-05-13

## 2018-07-05 RX ORDER — CLINDAMYCIN HYDROCHLORIDE 150 MG/1
150 CAPSULE ORAL 3 TIMES DAILY
Status: DISCONTINUED | OUTPATIENT
Start: 2018-07-05 | End: 2018-07-05 | Stop reason: HOSPADM

## 2018-07-05 RX ORDER — CLINDAMYCIN PHOSPHATE 600 MG/50ML
600 INJECTION, SOLUTION INTRAVENOUS EVERY 8 HOURS
Status: DISCONTINUED | OUTPATIENT
Start: 2018-07-05 | End: 2018-07-05

## 2018-07-05 RX ORDER — METHYLPREDNISOLONE 4 MG/1
8 TABLET ORAL
Status: DISCONTINUED | OUTPATIENT
Start: 2018-07-05 | End: 2018-07-05 | Stop reason: HOSPADM

## 2018-07-05 RX ORDER — METHYLPREDNISOLONE 4 MG/1
4 TABLET ORAL
Status: DISCONTINUED | OUTPATIENT
Start: 2018-07-07 | End: 2018-07-05 | Stop reason: HOSPADM

## 2018-07-05 RX ADMIN — CLINDAMYCIN IN 5 PERCENT DEXTROSE 600 MG: 12 INJECTION, SOLUTION INTRAVENOUS at 13:59

## 2018-07-05 RX ADMIN — SODIUM CHLORIDE: 9 INJECTION, SOLUTION INTRAVENOUS at 03:34

## 2018-07-05 RX ADMIN — DEXAMETHASONE SODIUM PHOSPHATE 10 MG: 4 INJECTION, SOLUTION INTRAMUSCULAR; INTRAVENOUS at 14:02

## 2018-07-05 RX ADMIN — CLINDAMYCIN IN 5 PERCENT DEXTROSE 600 MG: 12 INJECTION, SOLUTION INTRAVENOUS at 05:47

## 2018-07-05 RX ADMIN — DEXAMETHASONE SODIUM PHOSPHATE 10 MG: 4 INJECTION, SOLUTION INTRAMUSCULAR; INTRAVENOUS at 05:47

## 2018-07-05 ASSESSMENT — PAIN SCALES - GENERAL: PAINLEVEL_OUTOF10: 2

## 2018-07-05 ASSESSMENT — ENCOUNTER SYMPTOMS
HEADACHES: 1
ABDOMINAL PAIN: 0
FEVER: 0
COUGH: 0
DIAPHORESIS: 0
DIZZINESS: 0
NAUSEA: 0
SORE THROAT: 1
CHILLS: 0
DIARRHEA: 0

## 2018-07-05 ASSESSMENT — LIFESTYLE VARIABLES: ALCOHOL_USE: NO

## 2018-07-05 NOTE — H&P
DATE OF SERVICE:  07/04/2018    CHIEF COMPLAINT:  Sore throat, chills, and cough.    HISTORY OF PRESENT ILLNESS:  In brief, this is a very pleasant 27-year-old   female with no significant past medical history, presents to the ER with   complaints of sore throat and sinus congestion.  Symptoms have been ongoing   for about 2 weeks now, and she has been to urgent care twice and two Z-Paks   were prescribed with no improvement in symptoms.  She reports having visited   [facility] in [place] and reports having received IV   clindamycin in the ER, but apparently did not receive any oral antibiotics on   discharge.    Patient has symptoms including dysphagia, hoarseness of voice, and   odynophagia.  Patient reports that 3 days ago she had difficulty breathing and   at that time also been to the ER in [place], but received an albuterol   inhalation with little to no improvement in symptoms.  She, therefore, decided   to drive down to [facility] to get further care.  A CT scan at [facility]   was found to have a peritonsillar abscess on the left side and therefore, she   was sent here for ENT evaluation.    In the ER, she was evaluated by ERP, who treated her with clindamycin,   Toradol, and IV fluids.  The ER physician has contacted ENT, Dr. Johnathan Meng, who advised aspiration which was attempted by the ER physician.  No   drainage was obtained; however, but patient reports that she had some mild   improvement in symptoms after attempted drainage.  On advice of ENT, she is   being admitted for IV antibiotics and we will also place her on steroids to   help with swelling.    PAST MEDICAL HISTORY:  1.  Dental abscesses.  2.  Denies history of diabetes or immunodeficiency conditions.    PAST SURGICAL HISTORY:  Tubectomy.    FAMILY HISTORY:  Maternal grandmother has diabetes mellitus, otherwise no   significant family history.    SOCIAL HISTORY:  Denies smoking, denies alcohol use, and denies  recreational   drug use.    MEDICATIONS:  1.  Tylenol p.r.n.  2.  Ibuprofen p.r.n.  3.  Previously on azithromycin about a week ago, but currently discontinued.  4.  Multivitamins.    ALLERGIES:  ALLERGIC TO PENICILLINS AND CEPHALOSPORINS (ANAPHYLAXIS), AND ALSO   BLUE CHEESE.    REVIEW OF SYSTEMS:  CONSTITUTIONAL:  Denies fevers, reports chills.  HEENT:  Reports congestion, sore throat, cough.  RESPIRATORY:  Reports cough, denies shortness of breath.  CARDIOVASCULAR:  Denies chest pain, palpitations.   ABDOMINAL:  Denies nausea, vomiting, or abdominal pain.  GENITOURINARY:  Denies dysuria, increased frequency.  NEUROLOGICAL:  Denies focal deficits, seizures.    PHYSICAL EXAMINATION:  VITAL SIGNS:  Afebrile, heart rate in the 70s, blood pressure 110s/70s, O2   saturation of 97% on room air.  CONSTITUTIONAL:  Appears comfortable.  HEENT:  Normocephalic, atraumatic.  Throat:  Left-sided swelling visible,   erythematous, no drainage seen.  RESPIRATORY:  Clear to auscultation bilaterally.  CARDIOVASCULAR:  Regular rate and rhythm.  No murmurs, rubs or gallops.  ABDOMEN:  Soft, nontender.  Normal bowel sounds.  EXTREMITIES:  No pedal edema.  NEUROLOGIC:  Alert and oriented x4.    LABORATORY DATA:  WBC count was 6.4, BUN 12, creatinine was 0.8, and CO2 was   25.    IMAGING:  CT soft tissue of the neck shows 1x2x1 cm tonsillar abscess on the   left side and generalized lymphadenopathy on the left side as well.    ASSESSMENT:  This is a 27-year-old female presenting with peritonsillar   abscess.  Most common oral florae are group A strep, Staph and anaerobes.    Therefore for good coverage of all these organisms, clindamycin is an   appropriate choice of antibiotic.  Unasyn would have been an appropriate   choice as well, however, patient is SEVERELY ALLERGIC TO PENICILLINS.    She does not appear to have airway compromise at this moment, and is able to   maintain airway.  I do not see any concern as of now, but we will  have to keep   a very close watch over her and place her on continuous pulse oximetry.    PLAN:  1.  Continue clindamycin as an appropriate antibiotic.  2.  Wound cultures if possible to be drawn.  3.  Blood cultures to be drawn.  4.  Decadron 10 mg t.i.d. per recommendations of ERP and ENT.  5.  Continuous pulse oximetry, stat evaluation if patient develops any signs   of airway compromise or hypoxia.  6.  Chest x-ray to rule out any aspiration.    We will place the patient n.p.o. for midnight, in case ENT evaluation   tomorrow.  We will contact ENT physician in the a.m. for further plan of care.    Patient is full code.  Core measures have been addressed appropriately.    Please refer to the intern's history and physical note for more details.       ____________________________________     MD DANIELLE Mccain / CAIN    DD:  07/04/2018 19:35:28  DT:  07/04/2018 20:25:56    D#:  [id number]  Job#:  [id number]

## 2018-07-05 NOTE — PROGRESS NOTES
Bedside shift report rec.  POC reviewed with patient.    AAOx4.  Independent.  Room air.  Denies pain, N/V.  + voids, + flatus.  Last BM 7/3/18.  Minor pain, no pain meds needed at this time.     Call light and personal items within reach.  Hourly rounding in place.  All needs met at this time.

## 2018-07-05 NOTE — NON-PROVIDER
Internal Medicine Medical Student Note  Note Author: Andressa Dent, Student    Name Jeanne Juarez     1990   Age/Sex 27 y.o. female   MRN [id number]   Code Status FULL             Reason for interval visit  (Principal Problem)   Peritonsillar abscess    Interval Problem Daily Status Update  (problem status, last 24 hours, new history, new data )   Patient reports minimal improvement to dysphagia, odynophagia and reports continuing hoarseness.  Her pain is ~3/10 on morphine (2mg/3hrs currently).  She remains afebrile and vital signs are within normal limits.  She was transitioned from NPO to clear diet today.  Appetite is normal.  Pending wound cultures and rapid strep test results.  Blood cultures available.  Labs show normal wbc's with elevated neutrophils relative to lymphocytes.  Chest x-ray was normal.  Radiology reviewed external CT (from [facility] in [place]).  Ultrasound pending to assess retropharyngeal involvement.  ENT is coming to drain abscess today.          Physical Exam       Vitals:    18 1916 18 0322 18 0739 18 1331   BP: 107/60 (!) 98/55 108/61    Pulse: 63 (!) 55 72    Resp: 17 17 17    Temp: 37.4 °C (99.4 °F) 36.9 °C (98.5 °F) 36.3 °C (97.4 °F)    SpO2: 95% 95% 96%    Weight:    61.6 kg (135 lb 12.9 oz)   Height:         Body mass index is 20.65 kg/m². Weight: 61.6 kg (135 lb 12.9 oz)  Oxygen Therapy:  Pulse Oximetry: 96 %, O2 (LPM): 0, O2 Delivery: None (Room Air)    Physical Exam   Constitutional: She is oriented to person, place, and time and well-developed, well-nourished, and in no distress. Vital signs are normal.   HENT:   Head: Normocephalic and atraumatic.   Neck: Normal range of motion.   Pulmonary/Chest: Effort normal.   Lymphadenopathy:        Head (left side): Submandibular adenopathy present.   Left neck tender in submandibular and anterior cervical regions.   Neurological: She is alert and oriented to person, place, and time.   Skin:  Skin is warm, dry and intact.         Assessment/Plan     Peritonsillar abscess   -stable- throat remains erythematous, edematous, and partially obstructed (Mallampati ~2)  -uvula visible with slight right deviation   -likely pathogens: GAS or anaerobes such as fusobacterium necrophorum  -maintain clindamycin to cover both GAS and fusobacterium (pt. allergic to beta lactams)  -discontinue dexamethasone- no significant improvement in ~36 hrs   -no significant improvement on dexamethasone and clindamycin without further intervention  -two small abscesses visible on CT with significant inflammation extending into retropharyngeal space  -blood cultures negative for pathogen on 7/4 (pending wound cultures)  -review ultrasound results when available  -ENT has been consulted and will drain abscess today (7/5)  -educate patient on long-term effects of chronic antibiotic use (doyle clindamycin and possible c. diff)  -patient ready for discharge today to continue care in [place]

## 2018-07-05 NOTE — ASSESSMENT & PLAN NOTE
Has peritonsillar abscess, h/o difficulty breathing, drooling,  malampati score of 3 , hoarse voice     Close monitoring for stridor.

## 2018-07-05 NOTE — CONSULTS
DATE OF SERVICE:  07/05/2018    REASON FOR CONSULTATION:  Possible peritonsillar abscess.    HISTORY OF PRESENT ILLNESS:  The patient was admitted yesterday, had an   aspiration of a possible peritonsillar abscess where no purulence was   obtained.  She was admitted.  She has now been present for 24 hours.  She has   no evidence of fever.  She is able to speak without any difficulty.  She is   able to tolerate her secretions without any difficulty.  The patient does not   have hot potato voice whatsoever.    PHYSICAL EXAMINATION:  Shows no evidence of trismus.  The patient has no   evidence of peritonsillar abscess whatsoever.  There is evidence of recent   aspiration of the peritonsillar space, but there is no bulging of the anterior   tonsillar pillar.  The tonsil on the left is 2+, the tonsil on the right is   1+.  The airway is widely patent and there is absolutely no evidence of   abscess.  There is no swelling of the neck.    IMPRESSION:  The patient has a resolving peritonsillar cellulitis, and at this   point, the patient has no evidence of abscess.  She is having absolutely no   trismus, fever or any ongoing symptoms.  I spoke with her attending, Dr. Luna, and had him place her on clindamycin 150 mg 3 times daily for the   next 10 days and a Medrol Dosepak.  She can resume normal diet and she can be   discharged today.  The patient inquired about potentially getting her tonsils   out and certainly when she is better, she is more than welcome to see an ear,   nose, and throat physician and inquire about the appropriateness of a   tonsillectomy.       ____________________________________     MD JERICA SUN / CAIN    DD:  07/05/2018 16:01:01  DT:  07/05/2018 16:22:08    D#:  [id number]  Job#:  [id number]

## 2018-07-05 NOTE — ASSESSMENT & PLAN NOTE
-pain for two weeks  -previously treated with azithromycin, unknown amount, possibly two days  -prevsiously treated with two IV doses of clindamycin, last this AM in [place] [facility]  -no issues breathing currently, pain and difficulty swallowing per pt    Plan:  -started decadron q 8 hrs 7/4/18  -started clindamycin q 8 hrs 7/4/18  - IV fluids 100 ml/hr  -clear liquid diet  -Dr Meng will be seeing pt at bedside, as requested I placed syringes and needles at bedside and ordered lidocaine with epi stat

## 2018-07-05 NOTE — ASSESSMENT & PLAN NOTE
-115 this AM despite being NPO  -likely due to steroid administration  -will likely D/C steroids tomorrow  -will continue to monitor

## 2018-07-05 NOTE — PROGRESS NOTES
Internal Medicine Interval Note  Note Author: Madyson eMrino M.D.     Name Jeanne Juarez     1990   Age/Sex 27 y.o. female   MRN [id number]   Code Status Full     After 5PM or if no immediate response to page, please call for cross-coverage  Attending/Team: Dr. Luna/BRITANY Eid See Patient List for primary contact information  Call [phone] to page    1st Call - Day Intern (R1):   Dr Merino 2nd Call - Day Sr. Resident (R2/R3):   Dr Cedillo         Reason for interval visit  (Principal Problem)   Peritonsillar abcess      Interval Problem Daily Status Update  (24 hours, problem oriented, brief subjective history, new lab/imaging data pertinent to that problem)   Patient is still experiencing odophagia, hoarseness, and pain upon palpation of her neck. She also endorses headaches and neck pain. Also endorses pain extending up to left ear. She is handling her secretions. She has been NPO because she was having issues with secretions yesterday but we progressed her diet to clear liquids. She is afebrile and has no leukocytosis. Blood cultures were drawn and so far negative, although after pt received multiple doses of clindamycin. A culture of her tonsil was ordered but has not been collected yet. Chest xray normal. Head and neck CT was reviewed with [facility] radiology. There are two abscesses and swelling extending into the retropharyngeal space. Spoke with ENT and Dr. Meng agreed to evaluate patient today.     Review of Systems   Constitutional: Negative for chills, diaphoresis, fever and malaise/fatigue.   HENT: Positive for ear pain and sore throat.    Respiratory: Negative for cough.    Cardiovascular: Negative for chest pain.   Gastrointestinal: Negative for abdominal pain, diarrhea and nausea.   Neurological: Positive for headaches. Negative for dizziness.       Disposition/Barriers to discharge:   Will be converted to inpatient due to receiving IV abx and ENT consult.      Consultants/Specialty  Dr. Matias - ENT  PCP: @PCP      Quality Measures  Quality-Core Measures   Reviewed items::  Labs reviewed, Medications reviewed and Radiology images reviewed  Mcgarry catheter::  No Mcgarry  DVT prophylaxis pharmacological::  Not indicated at this time, ambulatory  Ulcer Prophylaxis::  Not indicated          Physical Exam       Vitals:    07/04/18 1916 07/05/18 0322 07/05/18 0739 07/05/18 1331   BP: 107/60 (!) 98/55 108/61    Pulse: 63 (!) 55 72    Resp: 17 17 17    Temp: 37.4 °C (99.4 °F) 36.9 °C (98.5 °F) 36.3 °C (97.4 °F)    SpO2: 95% 95% 96%    Weight:    61.6 kg (135 lb 12.9 oz)   Height:         Body mass index is 20.65 kg/m². Weight: 61.6 kg (135 lb 12.9 oz)  Oxygen Therapy:  Pulse Oximetry: 96 %, O2 (LPM): 0, O2 Delivery: None (Room Air)    Physical Exam  Constitutional: She is oriented to person, place, and time and well-developed, well-nourished, and in no distress.   HENT:   Head: Normocephalic and atraumatic.   Swelling of the left submandibular area, tenderness, no erythema of the skin  Left tonsil is erythematous and eschar formed, uvula deviated to the right   mallampati 3  Neck: Neck supple. No tracheal deviation present.   Cardiovascular: Normal rate, regular rhythm and normal heart sounds.    Pulmonary/Chest: Effort normal. No stridor. No respiratory distress. She has no wheezes. She has no rales.   Decreased breath sounds RLL   Musculoskeletal: Capillary refill +2   Lymphadenopathy: She has no cervical adenopathy.   Neurological: She is alert and oriented to person, place, and time. GCS score is 15.   Skin: Skin is warm and dry.   Nursing note and vitals reviewed.        Assessment/Plan     * Peritonsillar abscess   Assessment & Plan    -pain for two weeks  -previously treated with azithromycin, unknown amount, possibly two days  -prevsiously treated with two IV doses of clindamycin, last this AM in [place] ER  -no issues breathing currently, pain and difficulty  swallowing per pt    Plan:  -started decadron q 8 hrs 7/4/18  -started clindamycin q 8 hrs 7/4/18  - IV fluids 100 ml/hr  -clear liquid diet  -Dr Meng will be seeing pt at bedside, as requested I placed syringes and needles at bedside and ordered lidocaine with epi stat        Risk for Upper airway closure   Assessment & Plan    Has peritonsillar abscess, h/o difficulty breathing, drooling,  malampati score of 3 , hoarse voice     Close monitoring for stridor.          Impaired fasting glucose   Assessment & Plan    -115 this AM despite being NPO  -likely due to steroid administration  -will likely D/C steroids tomorrow  -will continue to monitor

## 2018-07-06 NOTE — DISCHARGE INSTRUCTIONS
Discharge Instructions    Discharged to home by car with relative. Discharged via walking, hospital escort: Refused.  Special equipment needed: Not Applicable    Be sure to schedule a follow-up appointment with your primary care doctor or any specialists as instructed.     Discharge Plan:   Diet Plan: Discussed  Activity Level: Discussed  Confirmed Follow up Appointment: Patient to Call and Schedule Appointment  Confirmed Symptoms Management: Discussed  Medication Reconciliation Updated: Yes  Influenza Vaccine Indication: Patient Refuses    I understand that a diet low in cholesterol, fat, and sodium is recommended for good health. Unless I have been given specific instructions below for another diet, I accept this instruction as my diet prescription.   Other diet: Soft foods    Special Instructions: None    · Is patient discharged on Warfarin / Coumadin?   No     Depression / Suicide Risk    As you are discharged from this [facility] facility, it is important to learn how to keep safe from harming yourself.    Recognize the warning signs:  · Abrupt changes in personality, positive or negative- including increase in energy   · Giving away possessions  · Change in eating patterns- significant weight changes-  positive or negative  · Change in sleeping patterns- unable to sleep or sleeping all the time   · Unwillingness or inability to communicate  · Depression  · Unusual sadness, discouragement and loneliness  · Talk of wanting to die  · Neglect of personal appearance   · Rebelliousness- reckless behavior  · Withdrawal from people/activities they love  · Confusion- inability to concentrate     If you or a loved one observes any of these behaviors or has concerns about self-harm, here's what you can do:  · Talk about it- your feelings and reasons for harming yourself  · Remove any means that you might use to hurt yourself (examples: pills, rope, extension cords, firearm)  · Get professional help from the community  (Mental Health, Substance Abuse, psychological counseling)  · Do not be alone:Call your Safe Contact- someone whom you trust who will be there for you.  · Call your local CRISIS HOTLINE 059-4296 or 643-659-2834  · Call your local Children's Mobile Crisis Response Team [place] (910) 778-9384 or www.Ridemakerz  · Call the toll free National Suicide Prevention Hotlines   · National Suicide Prevention Lifeline 256-410-FDFA (1389)  · [employer] Hope Line Network 800-SUICIDE (357-6996)        Methylprednisolone tablets  What is this medicine?  METHYLPREDNISOLONE (meth ill pred NISS oh lone) is a corticosteroid. It is commonly used to treat inflammation of the skin, joints, lungs, and other organs. Common conditions treated include asthma, allergies, and arthritis. It is also used for other conditions, such as blood disorders and diseases of the adrenal glands.  This medicine may be used for other purposes; ask your health care provider or pharmacist if you have questions.  COMMON BRAND NAME(S): Medrol, Medrol Dosepak  What should I tell my health care provider before I take this medicine?  They need to know if you have any of these conditions:  -Cushing's syndrome  -eye disease, vision problems  -diabetes  -glaucoma  -heart disease  -high blood pressure  -infection (especially a virus infection such as chickenpox, cold sores, or herpes)  -liver disease  -mental illness  -myasthenia gravis  -osteoporosis  -recently received or scheduled to receive a vaccine  -seizures  -stomach or intestine problems  -thyroid disease  -an unusual or allergic reaction to lactose, methylprednisolone, other medicines, foods, dyes, or preservatives  -pregnant or trying to get pregnant  -breast-feeding  How should I use this medicine?  Take this medicine by mouth with a glass of water. Follow the directions on the prescription label. Take this medicine with food. If you are taking this medicine once a day, take it in the morning. Do  not take it more often than directed. Do not suddenly stop taking your medicine because you may develop a severe reaction. Your doctor will tell you how much medicine to take. If your doctor wants you to stop the medicine, the dose may be slowly lowered over time to avoid any side effects.  Talk to your pediatrician regarding the use of this medicine in children. Special care may be needed.  Overdosage: If you think you have taken too much of this medicine contact a poison control center or emergency room at once.  NOTE: This medicine is only for you. Do not share this medicine with others.  What if I miss a dose?  If you miss a dose, take it as soon as you can. If it is almost time for your next dose, talk to your doctor or health care professional. You may need to miss a dose or take an extra dose. Do not take double or extra doses without advice.  What may interact with this medicine?  Do not take this medicine with any of the following medications:  -alefacept  -echinacea  -live virus vaccines  -metyrapone  -mifepristone  This medicine may also interact with the following medications:  -amphotericin B  -aspirin and aspirin-like medicines  -certain antibiotics like erythromycin, clarithromycin, troleandomycin  -certain medicines for diabetes  -certain medicines for fungal infections like ketoconazole  -certain medicines for seizures like carbamazepine, phenobarbital, phenytoin  -certain medicines that treat or prevent blood clots like warfarin  -cholestyramine  -cyclosporine  -digoxin  -diuretics  -female hormones, like estrogens and birth control pills  -isoniazid  -NSAIDs, medicines for pain inflammation, like ibuprofen or naproxen  -other medicines for myasthenia gravis  -rifampin  -vaccines  This list may not describe all possible interactions. Give your health care provider a list of all the medicines, herbs, non-prescription drugs, or dietary supplements you use. Also tell them if you smoke, drink alcohol,  or use illegal drugs. Some items may interact with your medicine.  What should I watch for while using this medicine?  Tell your doctor or healthcare professional if your symptoms do not start to get better or if they get worse. Do not stop taking except on your doctor's advice. You may develop a severe reaction. Your doctor will tell you how much medicine to take.  This medicine may increase your risk of getting an infection. Tell your doctor or health care professional if you are around anyone with measles or chickenpox, or if you develop sores or blisters that do not heal properly.  This medicine may affect blood sugar levels. If you have diabetes, check with your doctor or health care professional before you change your diet or the dose of your diabetic medicine.  Tell your doctor or health care professional right away if you have any change in your eyesight.  Using this medicine for a long time may increase your risk of low bone mass. Talk to your doctor about bone health.  What side effects may I notice from receiving this medicine?  Side effects that you should report to your doctor or health care professional as soon as possible:  -allergic reactions like skin rash, itching or hives, swelling of the face, lips, or tongue  -bloody or tarry stools  -changes in vision  -hallucination, loss of contact with reality  -muscle cramps  -muscle pain  -palpitations  -signs and symptoms of high blood sugar such as dizziness; dry mouth; dry skin; fruity breath; nausea; stomach pain; increased hunger or thirst; increased urination  -signs and symptoms of infection like fever or chills; cough; sore throat; pain or trouble passing urine  -trouble passing urine or change in the amount of urine  Side effects that usually do not require medical attention (report to your doctor or health care professional if they continue or are bothersome):  -changes in emotions or mood  -constipation  -diarrhea  -excessive hair growth on the  face or body  -headache  -nausea, vomiting  -trouble sleeping  -weight gain  This list may not describe all possible side effects. Call your doctor for medical advice about side effects. You may report side effects to FDA at 5-446-MQL-8947.  Where should I keep my medicine?  Keep out of the reach of children.  Store at room temperature between 20 and 25 degrees C (68 and 77 degrees F). Throw away any unused medicine after the expiration date.  NOTE: This sheet is a summary. It may not cover all possible information. If you have questions about this medicine, talk to your doctor, pharmacist, or health care provider.  © 2018 [employer]vier/Gold Standard (2017-02-23 15:53:30)        Clindamycin capsules  What is this medicine?  CLINDAMYCIN (KLIN maylin OLIVEROS sin) is a lincosamide antibiotic. It is used to treat certain kinds of bacterial infections. It will not work for colds, flu, or other viral infections.  This medicine may be used for other purposes; ask your health care provider or pharmacist if you have questions.  COMMON BRAND NAME(S): Cleocin  What should I tell my health care provider before I take this medicine?  They need to know if you have any of these conditions:  -kidney disease  -liver disease  -stomach problems like colitis  -an unusual or allergic reaction to clindamycin, lincomycin, or other medicines, foods, dyes like tartrazine or preservatives  -pregnant or trying to get pregnant  -breast-feeding  How should I use this medicine?  Take this medicine by mouth with a full glass of water. Follow the directions on the prescription label. You can take this medicine with food or on an empty stomach. If the medicine upsets your stomach, take it with food. Take your medicine at regular intervals. Do not take your medicine more often than directed. Take all of your medicine as directed even if you think your are better. Do not skip doses or stop your medicine early.  Talk to your pediatrician regarding the use of this  medicine in children. Special care may be needed.  Overdosage: If you think you have taken too much of this medicine contact a poison control center or emergency room at once.  NOTE: This medicine is only for you. Do not share this medicine with others.  What if I miss a dose?  If you miss a dose, take it as soon as you can. If it is almost time for your next dose, take only that dose. Do not take double or extra doses.  What may interact with this medicine?  -birth control pills  -erythromycin  -medicines that relax muscles for surgery  -rifampin  This list may not describe all possible interactions. Give your health care provider a list of all the medicines, herbs, non-prescription drugs, or dietary supplements you use. Also tell them if you smoke, drink alcohol, or use illegal drugs. Some items may interact with your medicine.  What should I watch for while using this medicine?  Tell your doctor or healthcare professional if your symptoms do not start to get better or if they get worse.  Do not treat diarrhea with over the counter products. Contact your doctor if you have diarrhea that lasts more than 2 days or if it is severe and watery.  What side effects may I notice from receiving this medicine?  Side effects that you should report to your doctor or health care professional as soon as possible:  -allergic reactions like skin rash, itching or hives, swelling of the face, lips, or tongue  -dark urine  -pain on swallowing  -redness, blistering, peeling or loosening of the skin, including inside the mouth  -unusual bleeding or bruising  -unusually weak or tired  -yellowing of eyes or skin  Side effects that usually do not require medical attention (report to your doctor or health care professional if they continue or are bothersome):  -diarrhea  -itching in the rectal or genital area  -joint pain  -nausea, vomiting  -stomach pain  This list may not describe all possible side effects. Call your doctor for medical  advice about side effects. You may report side effects to FDA at 1-855-AOC-1401.  Where should I keep my medicine?  Keep out of the reach of children.  Store at room temperature between 20 and 25 degrees C (68 and 77 degrees F). Throw away any unused medicine after the expiration date.  NOTE: This sheet is a summary. It may not cover all possible information. If you have questions about this medicine, talk to your doctor, pharmacist, or health care provider.  © 2018 Elsevier/Gold Standard (2017-03-22 16:34:00)

## 2018-07-06 NOTE — PROGRESS NOTES
Patient discharged home with home.  Educated patient on discharge instructions and gave written copy.  Patient verbalized understanding.  PIV removed.  Patient in stable condition.  Patient wheeled down by nurse assistant.

## 2018-07-06 NOTE — PROGRESS NOTES
Patient is unable to tolerate solid foods odophagia and dysphagia at this time, so she will need to remain inpatient. Will try to advance diet this evening

## 2018-07-06 NOTE — DISCHARGE SUMMARY
.          Internal Medicine Discharge Summary  Note Author: Elias Cedillo M.D.       Name Jeanne Juarez     1990   Age/Sex 27 y.o. female   MRN [id number]         Admit Date:  2018       Discharge Date:   2018    Service:   [facility] Internal Medicine Red Team  Attending Physician(s):   Dr. Luna       Senior Resident(s):   Dr. Cedillo  Niko Resident(s):   Dr. Merino  PCP: ORTIZ Maravilla M.D.      Primary Diagnosis:   Peritonsillar abscess    Secondary Diagnoses:                Principal Problem:    Peritonsillar abscess POA: Unknown      Overview: -pain for two weeks      -previously treated with azithromycin, unknown amount, possibly two days      -prevsiously treated with two IV doses of clindamycin, last this AM in       [place] ER      -h/o two oral abcesses  Active Problems:    Risk for Upper airway closure POA: Unknown    Impaired fasting glucose POA: Unknown  Resolved Problems:    * No resolved hospital problems. *      Hospital Summary (Brief Narrative):       Ms. Juarez is a very pleasant 27 year old female with past medical history significant for dental abscess presented to the ER as a transfer from [facility] in [place] for further evaluation of peritonsillar abscess. She was having symptoms since 2 weeks now and she had symptoms including odynophagia, and hoarseness of her voice.    She was transferred here for ENT evaluation after CT showed two peritonsillar abscesses. In the ER, drainage was attempted by the ER physician with no results. Dr. Meng was contacted in the ER who advised admission and treatment with IV antibiotics and steroids.     She was evaluated by ENT the following day and was deemed to not have any evidence of abscess but instead having resolving peritonsillar cellulitis. She was advised stay overnight to evaluate her for swallowing food, but on insistence of patient she was discharged with antibiotic and steroid regimen as recommended by  ENT.      Patient /Hospital Summary (Details -- Problem Oriented) :          Risk for Upper airway closure   Assessment & Plan    Evaluated by ENT and was deemed safe for discharge to home.        * Peritonsillar abscess   Assessment & Plan    -previously treated with azithromycin, unknown amount, possibly two days  -no issues breathing currently, pain and difficulty swallowing per pt  - received IV decadron and IV clindamycin while in hospital.  - discharged on medrol doespak and clindamycin.        Impaired fasting glucose   Assessment & Plan    Likely due to steroids.  Follow up with PCP.            Consultants:     ENT: Dr. Meng    Procedures:        Aspiration attempted on 7/4/2018 and 7/5/2018    Imaging/ Testing:      UE VENOUS DUPLEX         DX-CHEST-PORTABLE (1 VIEW)   Final Result      No acute cardiopulmonary disease.      CT-FOREIGN FILM CAT SCAN   Final Result            Discharge Medications:         Medication Reconciliation: Completed       Medication List      START taking these medications      Instructions   MethylPREDNISolone 4 MG Tbpk  Commonly known as:  MEDROL DOSEPAK   Day 1: Take 6 tab; Day 2: Take 4 tab; Day 3: Take 4 tab; Day 4: Take 3 tab; Day 5: Take 2 tab; Day 6: Take 1 tab        CHANGE how you take these medications      Instructions   clindamycin 150 MG Caps  What changed:  · when to take this  · additional instructions  Commonly known as:  CLEOCIN   Take 1 Cap by mouth 3 times a day.  Dose:  150 mg        CONTINUE taking these medications      Instructions   acetaminophen 500 MG Tabs  Commonly known as:  TYLENOL   Take 1,000 mg by mouth every 6 hours as needed for Mild Pain.  Dose:  1000 mg     Non Formulary Request   Take 1 Package by mouth every day. Thrive drink  Dose:  1 Package     Non Formulary Request   Take 1 Tab by mouth every day. Thrive tablet  Dose:  1 Tab     Non Formulary Request   Apply 1 Patch to affected area(s) every day. Thrive patch  Dose:  1 Patch        STOP  taking these medications    albuterol 108 (90 Base) MCG/ACT Aers inhalation aerosol     azithromycin 250 MG Tabs  Commonly known as:  ZITHROMAX     ibuprofen 200 MG Tabs  Commonly known as:  MOTRIN                  Disposition:   Discharged home    Diet:   Soft diet    Activity:   As tolerated    Instructions:        The patient was instructed to return to the ER in the event of worsening symptoms. I have counseled the patient on the importance of compliance and the patient has agreed to proceed with all medical recommendations and follow up plan indicated above.   The patient understands that all medications come with benefits and risks. Risks may include permanent injury or death and these risks can be minimized with close reassessment and monitoring.        Primary Care Provider:    ORTIZ Maravilla M.D.    Discharge summary faxed to primary care provider:  Completed  Copy of discharge summary given to the patient: Deferred      Follow up appointment details :      Advised follow up with PCP in 1 week to check for resolution of infection    Pending Studies:        Blood Culture    Time spent on discharge day patient visit, preparing discharge paperwork and arranging for patient follow up.    Summary of follow up issues:   Will need follow up for resolution of infection    Discharge Time (Minutes) :    32 minutes  Hospital Course Type: Inpatient Stay < 2 midnights, patient recovered more rapidly than anticipated      Condition on Discharge    ______________________________________________________________________    Interval history/exam for day of discharge:     Patient still has some odynophagia and dysphagia. She is able to tolerate jelly and is very insistent about going home. Strongly advised patient to stay overnight to monitor food intake, but patient elected to go home.    Vitals:    07/05/18 0322 07/05/18 0739 07/05/18 1331 07/05/18 1536   BP: (!) 98/55 108/61  104/59   Pulse: (!) 55 72  69   Resp: 17 17   17   Temp: 36.9 °C (98.5 °F) 36.3 °C (97.4 °F)  36.8 °C (98.2 °F)   SpO2: 95% 96%  94%   Weight:   61.6 kg (135 lb 12.9 oz)    Height:         Weight/BMI: Body mass index is 20.65 kg/m².  Pulse Oximetry: 94 %, O2 (LPM): 0, O2 Delivery: None (Room Air)    General: Appears comfortable  CVS: Regular rate and rhythm  PULM: Clear to auscultation      Most Recent Labs:    Lab Results   Component Value Date/Time    WBC 5.5 07/05/2018 03:40 AM    RBC 3.95 (L) 07/05/2018 03:40 AM    HEMOGLOBIN 11.4 (L) 07/05/2018 03:40 AM    HEMATOCRIT 35.4 (L) 07/05/2018 03:40 AM    MCV 89.6 07/05/2018 03:40 AM    MCH 28.9 07/05/2018 03:40 AM    MCHC 32.2 (L) 07/05/2018 03:40 AM    MPV 10.5 07/05/2018 03:40 AM    NEUTSPOLYS 80.90 (H) 07/05/2018 03:40 AM    LYMPHOCYTES 16.10 (L) 07/05/2018 03:40 AM    MONOCYTES 2.40 07/05/2018 03:40 AM    EOSINOPHILS 0.00 07/05/2018 03:40 AM    BASOPHILS 0.20 07/05/2018 03:40 AM      Lab Results   Component Value Date/Time    SODIUM 139 07/05/2018 03:40 AM    POTASSIUM 4.1 07/05/2018 03:40 AM    CHLORIDE 109 07/05/2018 03:40 AM    CO2 20 07/05/2018 03:40 AM    GLUCOSE 115 (H) 07/05/2018 03:40 AM    BUN 11 07/05/2018 03:40 AM    CREATININE 0.46 (L) 07/05/2018 03:40 AM      Lab Results   Component Value Date/Time    ALTSGPT 9 07/05/2018 03:40 AM    ASTSGOT 10 (L) 07/05/2018 03:40 AM    ALKPHOSPHAT 63 07/05/2018 03:40 AM    TBILIRUBIN 0.4 07/05/2018 03:40 AM    ALBUMIN 3.9 07/05/2018 03:40 AM    GLOBULIN 2.6 07/05/2018 03:40 AM     No results found for: PROTHROMBTM, INR

## 2018-07-09 LAB
BACTERIA BLD CULT: NORMAL
BACTERIA BLD CULT: NORMAL
SIGNIFICANT IND 70042: NORMAL
SIGNIFICANT IND 70042: NORMAL
SITE SITE: NORMAL
SITE SITE: NORMAL
SOURCE SOURCE: NORMAL
SOURCE SOURCE: NORMAL

## 2023-05-13 ENCOUNTER — HOSPITAL ENCOUNTER (INPATIENT)
Facility: MEDICAL CENTER | Age: 33
LOS: 2 days | DRG: 690 | End: 2023-05-15
Attending: EMERGENCY MEDICINE | Admitting: FAMILY MEDICINE
Payer: COMMERCIAL

## 2023-05-13 ENCOUNTER — HOSPITAL ENCOUNTER (OUTPATIENT)
Dept: RADIOLOGY | Facility: MEDICAL CENTER | Age: 33
End: 2023-05-13

## 2023-05-13 DIAGNOSIS — N20.0 NEPHROLITHIASIS: ICD-10-CM

## 2023-05-13 DIAGNOSIS — D50.0 IRON DEFICIENCY ANEMIA DUE TO CHRONIC BLOOD LOSS: ICD-10-CM

## 2023-05-13 DIAGNOSIS — N12 PYELONEPHRITIS: ICD-10-CM

## 2023-05-13 PROBLEM — D64.9 ANEMIA: Status: ACTIVE | Noted: 2023-05-13

## 2023-05-13 LAB
ALBUMIN SERPL BCP-MCNC: 4 G/DL (ref 3.2–4.9)
ALBUMIN/GLOB SERPL: 1.5 G/DL
ALP SERPL-CCNC: 91 U/L (ref 30–99)
ALT SERPL-CCNC: 27 U/L (ref 2–50)
ANION GAP SERPL CALC-SCNC: 13 MMOL/L (ref 7–16)
APPEARANCE UR: ABNORMAL
AST SERPL-CCNC: 26 U/L (ref 12–45)
BACTERIA #/AREA URNS HPF: NEGATIVE /HPF
BASOPHILS # BLD AUTO: 0.3 % (ref 0–1.8)
BASOPHILS # BLD: 0.02 K/UL (ref 0–0.12)
BILIRUB SERPL-MCNC: 0.5 MG/DL (ref 0.1–1.5)
BILIRUB UR QL STRIP.AUTO: NEGATIVE
BUN SERPL-MCNC: 8 MG/DL (ref 8–22)
CALCIUM ALBUM COR SERPL-MCNC: 8.3 MG/DL (ref 8.5–10.5)
CALCIUM SERPL-MCNC: 8.3 MG/DL (ref 8.5–10.5)
CHLORIDE SERPL-SCNC: 107 MMOL/L (ref 96–112)
CO2 SERPL-SCNC: 20 MMOL/L (ref 20–33)
COLOR UR: YELLOW
CREAT SERPL-MCNC: 0.84 MG/DL (ref 0.5–1.4)
EOSINOPHIL # BLD AUTO: 0 K/UL (ref 0–0.51)
EOSINOPHIL NFR BLD: 0 % (ref 0–6.9)
EPI CELLS #/AREA URNS HPF: NEGATIVE /HPF
ERYTHROCYTE [DISTWIDTH] IN BLOOD BY AUTOMATED COUNT: 42.6 FL (ref 35.9–50)
GFR SERPLBLD CREATININE-BSD FMLA CKD-EPI: 93 ML/MIN/1.73 M 2
GLOBULIN SER CALC-MCNC: 2.6 G/DL (ref 1.9–3.5)
GLUCOSE SERPL-MCNC: 98 MG/DL (ref 65–99)
GLUCOSE UR STRIP.AUTO-MCNC: NEGATIVE MG/DL
HCG SERPL QL: NEGATIVE
HCT VFR BLD AUTO: 31.5 % (ref 37–47)
HGB BLD-MCNC: 10.1 G/DL (ref 12–16)
IMM GRANULOCYTES # BLD AUTO: 0.02 K/UL (ref 0–0.11)
IMM GRANULOCYTES NFR BLD AUTO: 0.3 % (ref 0–0.9)
KETONES UR STRIP.AUTO-MCNC: 40 MG/DL
LACTATE SERPL-SCNC: 0.6 MMOL/L (ref 0.5–2)
LACTATE SERPL-SCNC: 0.9 MMOL/L (ref 0.5–2)
LEUKOCYTE ESTERASE UR QL STRIP.AUTO: ABNORMAL
LIPASE SERPL-CCNC: 17 U/L (ref 11–82)
LYMPHOCYTES # BLD AUTO: 0.85 K/UL (ref 1–4.8)
LYMPHOCYTES NFR BLD: 12.2 % (ref 22–41)
MCH RBC QN AUTO: 26.6 PG (ref 27–33)
MCHC RBC AUTO-ENTMCNC: 32.1 G/DL (ref 33.6–35)
MCV RBC AUTO: 82.9 FL (ref 81.4–97.8)
MICRO URNS: ABNORMAL
MONOCYTES # BLD AUTO: 0.64 K/UL (ref 0–0.85)
MONOCYTES NFR BLD AUTO: 9.2 % (ref 0–13.4)
NEUTROPHILS # BLD AUTO: 5.42 K/UL (ref 2–7.15)
NEUTROPHILS NFR BLD: 78 % (ref 44–72)
NITRITE UR QL STRIP.AUTO: NEGATIVE
NRBC # BLD AUTO: 0 K/UL
NRBC BLD-RTO: 0 /100 WBC
PH UR STRIP.AUTO: 5.5 [PH] (ref 5–8)
PLATELET # BLD AUTO: 187 K/UL (ref 164–446)
PMV BLD AUTO: 10.3 FL (ref 9–12.9)
POTASSIUM SERPL-SCNC: 4.2 MMOL/L (ref 3.6–5.5)
PROT SERPL-MCNC: 6.6 G/DL (ref 6–8.2)
PROT UR QL STRIP: 30 MG/DL
RBC # BLD AUTO: 3.8 M/UL (ref 4.2–5.4)
RBC # URNS HPF: ABNORMAL /HPF
RBC UR QL AUTO: ABNORMAL
SODIUM SERPL-SCNC: 140 MMOL/L (ref 135–145)
SP GR UR STRIP.AUTO: 1.04
UROBILINOGEN UR STRIP.AUTO-MCNC: 1 MG/DL
WBC # BLD AUTO: 7 K/UL (ref 4.8–10.8)
WBC #/AREA URNS HPF: ABNORMAL /HPF

## 2023-05-13 PROCEDURE — 87040 BLOOD CULTURE FOR BACTERIA: CPT

## 2023-05-13 PROCEDURE — A9270 NON-COVERED ITEM OR SERVICE: HCPCS | Performed by: FAMILY MEDICINE

## 2023-05-13 PROCEDURE — 81001 URINALYSIS AUTO W/SCOPE: CPT

## 2023-05-13 PROCEDURE — 700111 HCHG RX REV CODE 636 W/ 250 OVERRIDE (IP): Performed by: FAMILY MEDICINE

## 2023-05-13 PROCEDURE — 80053 COMPREHEN METABOLIC PANEL: CPT

## 2023-05-13 PROCEDURE — 83690 ASSAY OF LIPASE: CPT

## 2023-05-13 PROCEDURE — 83605 ASSAY OF LACTIC ACID: CPT

## 2023-05-13 PROCEDURE — 36415 COLL VENOUS BLD VENIPUNCTURE: CPT

## 2023-05-13 PROCEDURE — 700105 HCHG RX REV CODE 258: Performed by: FAMILY MEDICINE

## 2023-05-13 PROCEDURE — 84703 CHORIONIC GONADOTROPIN ASSAY: CPT

## 2023-05-13 PROCEDURE — 700102 HCHG RX REV CODE 250 W/ 637 OVERRIDE(OP): Performed by: FAMILY MEDICINE

## 2023-05-13 PROCEDURE — 99223 1ST HOSP IP/OBS HIGH 75: CPT | Performed by: FAMILY MEDICINE

## 2023-05-13 PROCEDURE — 99285 EMERGENCY DEPT VISIT HI MDM: CPT

## 2023-05-13 PROCEDURE — 85025 COMPLETE CBC W/AUTO DIFF WBC: CPT

## 2023-05-13 PROCEDURE — 87086 URINE CULTURE/COLONY COUNT: CPT

## 2023-05-13 PROCEDURE — 700111 HCHG RX REV CODE 636 W/ 250 OVERRIDE (IP): Performed by: NURSE PRACTITIONER

## 2023-05-13 PROCEDURE — 700105 HCHG RX REV CODE 258: Performed by: NURSE PRACTITIONER

## 2023-05-13 PROCEDURE — 770006 HCHG ROOM/CARE - MED/SURG/GYN SEMI*

## 2023-05-13 RX ORDER — KETOROLAC TROMETHAMINE 30 MG/ML
15 INJECTION, SOLUTION INTRAMUSCULAR; INTRAVENOUS ONCE
Status: COMPLETED | OUTPATIENT
Start: 2023-05-13 | End: 2023-05-13

## 2023-05-13 RX ORDER — OXYCODONE HYDROCHLORIDE 5 MG/1
5 TABLET ORAL
Status: DISCONTINUED | OUTPATIENT
Start: 2023-05-13 | End: 2023-05-15 | Stop reason: HOSPADM

## 2023-05-13 RX ORDER — CIPROFLOXACIN 2 MG/ML
400 INJECTION, SOLUTION INTRAVENOUS EVERY 12 HOURS
Status: DISCONTINUED | OUTPATIENT
Start: 2023-05-13 | End: 2023-05-15 | Stop reason: HOSPADM

## 2023-05-13 RX ORDER — SODIUM CHLORIDE, SODIUM LACTATE, POTASSIUM CHLORIDE, AND CALCIUM CHLORIDE .6; .31; .03; .02 G/100ML; G/100ML; G/100ML; G/100ML
1000 INJECTION, SOLUTION INTRAVENOUS ONCE
Status: COMPLETED | OUTPATIENT
Start: 2023-05-13 | End: 2023-05-13

## 2023-05-13 RX ORDER — MORPHINE SULFATE 4 MG/ML
4 INJECTION INTRAVENOUS
Status: DISCONTINUED | OUTPATIENT
Start: 2023-05-13 | End: 2023-05-15 | Stop reason: HOSPADM

## 2023-05-13 RX ORDER — PROCHLORPERAZINE EDISYLATE 5 MG/ML
5-10 INJECTION INTRAMUSCULAR; INTRAVENOUS EVERY 4 HOURS PRN
Status: DISCONTINUED | OUTPATIENT
Start: 2023-05-13 | End: 2023-05-15 | Stop reason: HOSPADM

## 2023-05-13 RX ORDER — BISACODYL 10 MG
10 SUPPOSITORY, RECTAL RECTAL
Status: DISCONTINUED | OUTPATIENT
Start: 2023-05-13 | End: 2023-05-15 | Stop reason: HOSPADM

## 2023-05-13 RX ORDER — AMOXICILLIN 250 MG
2 CAPSULE ORAL 2 TIMES DAILY
Status: DISCONTINUED | OUTPATIENT
Start: 2023-05-13 | End: 2023-05-15 | Stop reason: HOSPADM

## 2023-05-13 RX ORDER — ACETAMINOPHEN 325 MG/1
650 TABLET ORAL EVERY 6 HOURS PRN
Status: DISCONTINUED | OUTPATIENT
Start: 2023-05-13 | End: 2023-05-15 | Stop reason: HOSPADM

## 2023-05-13 RX ORDER — ONDANSETRON 2 MG/ML
4 INJECTION INTRAMUSCULAR; INTRAVENOUS EVERY 4 HOURS PRN
Status: DISCONTINUED | OUTPATIENT
Start: 2023-05-13 | End: 2023-05-15 | Stop reason: HOSPADM

## 2023-05-13 RX ORDER — PROMETHAZINE HYDROCHLORIDE 25 MG/1
12.5-25 SUPPOSITORY RECTAL EVERY 4 HOURS PRN
Status: DISCONTINUED | OUTPATIENT
Start: 2023-05-13 | End: 2023-05-15 | Stop reason: HOSPADM

## 2023-05-13 RX ORDER — IBUPROFEN 200 MG
600 TABLET ORAL EVERY 6 HOURS PRN
COMMUNITY

## 2023-05-13 RX ORDER — OXYCODONE HYDROCHLORIDE 10 MG/1
10 TABLET ORAL
Status: DISCONTINUED | OUTPATIENT
Start: 2023-05-13 | End: 2023-05-15 | Stop reason: HOSPADM

## 2023-05-13 RX ORDER — PROMETHAZINE HYDROCHLORIDE 25 MG/1
12.5-25 TABLET ORAL EVERY 4 HOURS PRN
Status: DISCONTINUED | OUTPATIENT
Start: 2023-05-13 | End: 2023-05-15 | Stop reason: HOSPADM

## 2023-05-13 RX ORDER — ONDANSETRON 4 MG/1
4 TABLET, ORALLY DISINTEGRATING ORAL EVERY 4 HOURS PRN
Status: DISCONTINUED | OUTPATIENT
Start: 2023-05-13 | End: 2023-05-15 | Stop reason: HOSPADM

## 2023-05-13 RX ORDER — POLYETHYLENE GLYCOL 3350 17 G/17G
1 POWDER, FOR SOLUTION ORAL
Status: DISCONTINUED | OUTPATIENT
Start: 2023-05-13 | End: 2023-05-15 | Stop reason: HOSPADM

## 2023-05-13 RX ORDER — SODIUM CHLORIDE 9 MG/ML
INJECTION, SOLUTION INTRAVENOUS CONTINUOUS
Status: DISCONTINUED | OUTPATIENT
Start: 2023-05-13 | End: 2023-05-15 | Stop reason: HOSPADM

## 2023-05-13 RX ADMIN — SODIUM CHLORIDE: 9 INJECTION, SOLUTION INTRAVENOUS at 16:54

## 2023-05-13 RX ADMIN — ACETAMINOPHEN 650 MG: 325 TABLET, FILM COATED ORAL at 16:54

## 2023-05-13 RX ADMIN — SODIUM CHLORIDE, POTASSIUM CHLORIDE, SODIUM LACTATE AND CALCIUM CHLORIDE 1000 ML: 600; 310; 30; 20 INJECTION, SOLUTION INTRAVENOUS at 21:38

## 2023-05-13 RX ADMIN — ONDANSETRON 4 MG: 4 TABLET, ORALLY DISINTEGRATING ORAL at 16:17

## 2023-05-13 RX ADMIN — KETOROLAC TROMETHAMINE 15 MG: 30 INJECTION, SOLUTION INTRAMUSCULAR; INTRAVENOUS at 22:07

## 2023-05-13 RX ADMIN — ONDANSETRON 4 MG: 4 TABLET, ORALLY DISINTEGRATING ORAL at 21:39

## 2023-05-13 RX ADMIN — CIPROFLOXACIN 400 MG: 2 INJECTION, SOLUTION INTRAVENOUS at 18:11

## 2023-05-13 RX ADMIN — OXYCODONE 5 MG: 5 TABLET ORAL at 16:16

## 2023-05-13 ASSESSMENT — COGNITIVE AND FUNCTIONAL STATUS - GENERAL
SUGGESTED CMS G CODE MODIFIER DAILY ACTIVITY: CH
DAILY ACTIVITIY SCORE: 24
MOBILITY SCORE: 24
SUGGESTED CMS G CODE MODIFIER MOBILITY: CH

## 2023-05-13 ASSESSMENT — ENCOUNTER SYMPTOMS
WHEEZING: 0
HEADACHES: 0
VOMITING: 1
HEARTBURN: 0
SHORTNESS OF BREATH: 0
NAUSEA: 1
FEVER: 0
DIAPHORESIS: 0
WEAKNESS: 1
SORE THROAT: 0
DIARRHEA: 0
FLANK PAIN: 1
ABDOMINAL PAIN: 0
FOCAL WEAKNESS: 0
SENSORY CHANGE: 0
MYALGIAS: 0
BACK PAIN: 0
NECK PAIN: 0
NERVOUS/ANXIOUS: 0
COUGH: 0
DIZZINESS: 0
PALPITATIONS: 0
CHILLS: 1
SPEECH CHANGE: 0
BLURRED VISION: 0

## 2023-05-13 ASSESSMENT — LIFESTYLE VARIABLES
EVER FELT BAD OR GUILTY ABOUT YOUR DRINKING: NO
TOTAL SCORE: 0
TOTAL SCORE: 0
AVERAGE NUMBER OF DAYS PER WEEK YOU HAVE A DRINK CONTAINING ALCOHOL: 0
HOW MANY TIMES IN THE PAST YEAR HAVE YOU HAD 5 OR MORE DRINKS IN A DAY: 0
EVER HAD A DRINK FIRST THING IN THE MORNING TO STEADY YOUR NERVES TO GET RID OF A HANGOVER: NO
HAVE PEOPLE ANNOYED YOU BY CRITICIZING YOUR DRINKING: NO
HAVE YOU EVER FELT YOU SHOULD CUT DOWN ON YOUR DRINKING: NO
CONSUMPTION TOTAL: NEGATIVE
ALCOHOL_USE: NO
ON A TYPICAL DAY WHEN YOU DRINK ALCOHOL HOW MANY DRINKS DO YOU HAVE: 0
DOES PATIENT WANT TO STOP DRINKING: NO
TOTAL SCORE: 0

## 2023-05-13 ASSESSMENT — PAIN DESCRIPTION - PAIN TYPE
TYPE: ACUTE PAIN
TYPE: ACUTE PAIN

## 2023-05-13 ASSESSMENT — PATIENT HEALTH QUESTIONNAIRE - PHQ9
1. LITTLE INTEREST OR PLEASURE IN DOING THINGS: NOT AT ALL
2. FEELING DOWN, DEPRESSED, IRRITABLE, OR HOPELESS: NOT AT ALL
SUM OF ALL RESPONSES TO PHQ9 QUESTIONS 1 AND 2: 0

## 2023-05-13 NOTE — ED TRIAGE NOTES
Vitals:    05/13/23 1329   BP: 97/58   Pulse: 90   Resp: 16   Temp: 37.9 °C (100.3 °F)   SpO2: 96%     Chief Complaint   Patient presents with    Flank Pain     Pt was seen at [facility] and dx with kidney stones per CT. She was transferred here by private vehicle. Pt reports 6 out of 10 bilateral flank pain.     Pt is ambulatory to and from triage and is alert and oriented x 4.

## 2023-05-13 NOTE — ED PROVIDER NOTES
ED Provider Note    CHIEF COMPLAINT  Chief Complaint   Patient presents with    Flank Pain       EXTERNAL RECORDS REVIEWED  Other transfer center notes reviewed.  Patient was seen at [facility] and diagnosed with kidney stones patient needs a urology consultation and therefore was transferred.  She is pyelonephritis UTI multiple large kidney stones hydroureter no stones within the ureter.    Reviewing [facility] notes.  CT there shows multiple stones within the left kidney mild hydronephrosis no significant hydroureter urinalysis indicative of infection she was given Levaquin.  She has had anaphylaxis to penicillins and hives with cephalosporins.  Normal white count afebrile.  Back and headache much improved after Toradol and antiemetics and fluids.    CT scan from the outside facility was performed with contrast.  There were multiple nonobstructing left renal stones.  There is mild left-sided hydronephrosis.  The left ureter is not dilated there is very subtle patchy contrast-enhancement of the left kidney and mild left-sided pyelonephritis is not excluded.    HPI/ROS  LIMITATION TO HISTORY   Select: : None  OUTSIDE HISTORIAN(S):  none    Jeanne Tyrell Juarez is a 32 y.o. female who presents transferred from [facility] for kidney stone and infection.  Patient reports she has been having 1 month of dysuria and feeling miserable.  She said it got worse last night with vomiting and fevers.  She has abdominal pain and bilateral flank pain that is slightly worse on the left.  She was seen at [facility] and diagnosed with a urine infection and kidney stones and sent here for urology consultation.    PAST MEDICAL HISTORY   has a past medical history of Dental abscess (2016), Hip fracture (HCC), and Pregnancy.    SURGICAL HISTORY   has a past surgical history that includes tubal ligation (2017).    FAMILY HISTORY  Family History   Problem Relation Age of Onset    Breast Cancer Maternal Grandmother     Diabetes  "Maternal Grandfather     Cancer Paternal Grandfather        SOCIAL HISTORY  Social History     Tobacco Use    Smoking status: Never    Smokeless tobacco: Never   Substance and Sexual Activity    Alcohol use: No    Drug use: No    Sexual activity: Not Currently     Birth control/protection: Surgical       CURRENT MEDICATIONS  Home Medications       Reviewed by Prerna Esposito (Pharmacy Tech) on 05/13/23 at 1548  Med List Status: Complete     Medication Last Dose Status   acetaminophen (TYLENOL) 325 MG Tab 5/12/2023 Active   ibuprofen (MOTRIN) 200 MG Tab 5/12/2023 Active                    ALLERGIES  Allergies   Allergen Reactions    Amoxicillin Anaphylaxis    Penicillins Anaphylaxis    Keflex [Cephalexin] Rash             PHYSICAL EXAM  VITAL SIGNS: BP 97/58   Pulse 90   Temp 37.9 °C (100.3 °F) (Temporal)   Resp 16   Ht 1.727 m (5' 8\")   Wt 67.1 kg (147 lb 14.9 oz)   LMP 04/29/2023 Comment: tubes tied  SpO2 96%   BMI 22.49 kg/m²    Constitutional: Alert in no apparent distress.  HENT: No signs of trauma, Bilateral external ears normal, Nose normal.   Eyes: Pupils are equal and reactive, Conjunctiva normal, Non-icteric.   Neck:  No stridor.   Cardiovascular: Regular rate and rhythm, no murmurs.   Thorax & Lungs: Normal breath sounds, No respiratory distress, No wheezing, No chest tenderness.   Abdomen: Bowel sounds normal, Soft, mild lower abdominal tenderness, No masses, No peritoneal signs.  Skin: Warm, Dry, No erythema, No rash.   Back: No significant CVA tenderness  Musculoskeletal:  No major deformities noted.   Neurologic: Alert, moving all extremities without difficulty, no focal deficits.      DIAGNOSTIC STUDIES / PROCEDURES      LABS  Results for orders placed or performed during the hospital encounter of 05/13/23   CBC WITH DIFFERENTIAL   Result Value Ref Range    WBC 7.0 4.8 - 10.8 K/uL    RBC 3.80 (L) 4.20 - 5.40 M/uL    Hemoglobin 10.1 (L) 12.0 - 16.0 g/dL    Hematocrit 31.5 (L) 37.0 - 47.0 % "    MCV 82.9 81.4 - 97.8 fL    MCH 26.6 (L) 27.0 - 33.0 pg    MCHC 32.1 (L) 33.6 - 35.0 g/dL    RDW 42.6 35.9 - 50.0 fL    Platelet Count 187 164 - 446 K/uL    MPV 10.3 9.0 - 12.9 fL    Neutrophils-Polys 78.00 (H) 44.00 - 72.00 %    Lymphocytes 12.20 (L) 22.00 - 41.00 %    Monocytes 9.20 0.00 - 13.40 %    Eosinophils 0.00 0.00 - 6.90 %    Basophils 0.30 0.00 - 1.80 %    Immature Granulocytes 0.30 0.00 - 0.90 %    Nucleated RBC 0.00 /100 WBC    Neutrophils (Absolute) 5.42 2.00 - 7.15 K/uL    Lymphs (Absolute) 0.85 (L) 1.00 - 4.80 K/uL    Monos (Absolute) 0.64 0.00 - 0.85 K/uL    Eos (Absolute) 0.00 0.00 - 0.51 K/uL    Baso (Absolute) 0.02 0.00 - 0.12 K/uL    Immature Granulocytes (abs) 0.02 0.00 - 0.11 K/uL    NRBC (Absolute) 0.00 K/uL   COMP METABOLIC PANEL   Result Value Ref Range    Sodium 140 135 - 145 mmol/L    Potassium 4.2 3.6 - 5.5 mmol/L    Chloride 107 96 - 112 mmol/L    Co2 20 20 - 33 mmol/L    Anion Gap 13.0 7.0 - 16.0    Glucose 98 65 - 99 mg/dL    Bun 8 8 - 22 mg/dL    Creatinine 0.84 0.50 - 1.40 mg/dL    Calcium 8.3 (L) 8.5 - 10.5 mg/dL    AST(SGOT) 26 12 - 45 U/L    ALT(SGPT) 27 2 - 50 U/L    Alkaline Phosphatase 91 30 - 99 U/L    Total Bilirubin 0.5 0.1 - 1.5 mg/dL    Albumin 4.0 3.2 - 4.9 g/dL    Total Protein 6.6 6.0 - 8.2 g/dL    Globulin 2.6 1.9 - 3.5 g/dL    A-G Ratio 1.5 g/dL   LIPASE   Result Value Ref Range    Lipase 17 11 - 82 U/L   HCG QUAL SERUM   Result Value Ref Range    Beta-Hcg Qualitative Serum Negative Negative   URINALYSIS,CULTURE IF INDICATED    Specimen: Urine   Result Value Ref Range    Color Yellow     Character Cloudy (A)     Specific Gravity 1.043 <1.035    Ph 5.5 5.0 - 8.0    Glucose Negative Negative mg/dL    Ketones 40 (A) Negative mg/dL    Protein 30 (A) Negative mg/dL    Bilirubin Negative Negative    Urobilinogen, Urine 1.0 Negative    Nitrite Negative Negative    Leukocyte Esterase Moderate (A) Negative    Occult Blood Moderate (A) Negative    Micro Urine Req Microscopic     URINE MICROSCOPIC (W/UA)   Result Value Ref Range    WBC Packed (A) /hpf    RBC 5-10 (A) /hpf    Bacteria Negative None /hpf    Epithelial Cells Negative /hpf   CORRECTED CALCIUM   Result Value Ref Range    Correct Calcium 8.3 (L) 8.5 - 10.5 mg/dL   ESTIMATED GFR   Result Value Ref Range    GFR (CKD-EPI) 93 >60 mL/min/1.73 m 2   LACTIC ACID   Result Value Ref Range    Lactic Acid 0.6 0.5 - 2.0 mmol/L         RADIOLOGY  I have independently interpreted the diagnostic imaging associated with this visit and am waiting the final reading from the radiologist.   My preliminary interpretation is as follows: Multiple stones in the left kidney without evidence of hydroureter      COURSE & MEDICAL DECISION MAKING    ED Observation Status? Yes; I am placing the patient in to an observation status due to a diagnostic uncertainty as well as therapeutic intensity. Patient placed in observation status at 2:13 PM, 5/13/2023.     Observation plan is as follows: Labs and urologic consultation    Upon Reevaluation, the patient's condition has: not improved; and will be escalated to hospitalization.    Patient discharged from ED Observation status at 2:48 PM (Time) 5/13/2023 (Date).     INITIAL ASSESSMENT, COURSE AND PLAN  Care Narrative: This is a 32-year-old female that presents for possible urology consultation.  She has evidence of urinary tract infection and 1 month of waxing and waning flank pain.  Initially on arrival she had a temperature of 100.3 her heart rate was .  She was well-appearing.  Labs were obtained she has normal white count without left shift.  Urine is indicative of infection.  Patient has already been given antibiotics.      2:47 PM  I spoke with Dr. Waterman, urology who advised patient to be hospitalized for pyelonephritis with the medicine service.  She should be n.p.o. at midnight they will consult on her likely in the morning if she is getting worse there may need to be some surgical intervention  for the stones that she has.    I then spoke with Dr. Taylor, hospitalist who is agreeable to consult for hospitalization.  Patient will be hospitalized in guarded condition.      DISPOSITION AND DISCUSSIONS  I have discussed management of the patient with the following physicians and CAROLINE's:  Aparna Rodriguez, Claudia        FINAL DIAGNOSIS  1. Pyelonephritis    2. Nephrolithiasis           Electronically signed by: Donna Baxter M.D., 5/13/2023 2:12 PM

## 2023-05-13 NOTE — H&P
Hospital Medicine History & Physical Note    Date of Service  5/13/2023    Primary Care Physician  Fady Maravilla M.D.    Consultants  urology    Specialist Names: Jennifer    Code Status  Full Code    Chief Complaint  Chief Complaint   Patient presents with    Flank Pain       History of Presenting Illness  Jeanne Juarez is a 32 y.o. female who presented 5/13/2023 with flank pain.  She presented to another facility with bilateral flank pain, chills, dysuria, hematuria.  Patient states she has been having intermittent flank pain for the past month.  But last night she started having chills, nausea, vomiting, and dysuria with an episode of hematuria.  She was seen in outside facility where CT renal apparently showed left nephrolithiasis, mild left hydronephrosis but no obstructing stone appreciated.  She was given a dose of IV Levaquin.  She was then transferred here for urology consult.  Urology has been consulted on the case.  She states she has known history of kidney stones in the past, but has never had a urological procedure.    I discussed the plan of care with patient, family, and ED .    Review of Systems  Review of Systems   Constitutional:  Positive for chills. Negative for diaphoresis, fever and malaise/fatigue.   HENT:  Negative for congestion, hearing loss and sore throat.    Eyes:  Negative for blurred vision.   Respiratory:  Negative for cough, shortness of breath and wheezing.    Cardiovascular:  Negative for chest pain, palpitations and leg swelling.   Gastrointestinal:  Positive for nausea and vomiting. Negative for abdominal pain, diarrhea and heartburn.   Genitourinary:  Positive for dysuria, flank pain and hematuria.   Musculoskeletal:  Negative for back pain, joint pain, myalgias and neck pain.   Skin:  Negative for rash.   Neurological:  Positive for weakness. Negative for dizziness, sensory change, speech change, focal weakness and headaches.   Psychiatric/Behavioral:  The patient is  not nervous/anxious.        Past Medical History   has a past medical history of Dental abscess (2016), Hip fracture (HCC), and Pregnancy.    Surgical History   has a past surgical history that includes tubal ligation (2017).     Family History  family history includes Breast Cancer in her maternal grandmother; Cancer in her paternal grandfather; Diabetes in her maternal grandfather.   Family history reviewed with patient. There is no family history that is pertinent to the chief complaint.     Social History   reports that she has never smoked. She has never used smokeless tobacco. She reports that she does not drink alcohol and does not use drugs.    Allergies  Allergies   Allergen Reactions    Amoxicillin     Cephalexin     Keflex     Penicillins        Medications  Prior to Admission Medications   Prescriptions Last Dose Informant Patient Reported? Taking?   MethylPREDNISolone (MEDROL DOSEPAK) 4 MG Tablet Therapy Pack   No No   Sig: Day 1: Take 6 tab; Day 2: Take 4 tab; Day 3: Take 4 tab; Day 4: Take 3 tab; Day 5: Take 2 tab; Day 6: Take 1 tab   Non Formulary Request  Patient Yes No   Sig: Take 1 Package by mouth every day. Thrive drink   Non Formulary Request  Patient Yes No   Sig: Take 1 Tab by mouth every day. Thrive tablet   Non Formulary Request  Patient Yes No   Sig: Apply 1 Patch to affected area(s) every day. Thrive patch   acetaminophen (TYLENOL) 500 MG Tab  Patient Yes No   Sig: Take 1,000 mg by mouth every 6 hours as needed for Mild Pain.   clindamycin (CLEOCIN) 150 MG Cap   No No   Sig: Take 1 Cap by mouth 3 times a day.      Facility-Administered Medications: None       Physical Exam  Temp:  [37.9 °C (100.3 °F)] 37.9 °C (100.3 °F)  Pulse:  [90] 90  Resp:  [16] 16  BP: (97)/(58) 97/58  SpO2:  [96 %] 96 %  Blood Pressure: 97/58   Temperature: 37.9 °C (100.3 °F)   Pulse: 90   Respiration: 16   Pulse Oximetry: 96 %       Physical Exam  Vitals and nursing note reviewed.   HENT:      Head: Normocephalic  and atraumatic.      Nose: No congestion.      Mouth/Throat:      Mouth: Mucous membranes are dry.   Eyes:      Extraocular Movements: Extraocular movements intact.      Conjunctiva/sclera: Conjunctivae normal.   Cardiovascular:      Rate and Rhythm: Normal rate and regular rhythm.   Pulmonary:      Effort: Pulmonary effort is normal.      Breath sounds: Normal breath sounds.   Abdominal:      General: There is no distension.      Tenderness: There is no abdominal tenderness. There is right CVA tenderness and left CVA tenderness. There is no guarding or rebound.   Musculoskeletal:      Cervical back: No tenderness.      Right lower leg: No edema.      Left lower leg: No edema.   Skin:     General: Skin is warm and dry.   Neurological:      General: No focal deficit present.      Mental Status: She is alert and oriented to person, place, and time.      Cranial Nerves: No cranial nerve deficit.         Laboratory:  Recent Labs     05/13/23  1342   WBC 7.0   RBC 3.80*   HEMOGLOBIN 10.1*   HEMATOCRIT 31.5*   MCV 82.9   MCH 26.6*   MCHC 32.1*   RDW 42.6   PLATELETCT 187   MPV 10.3     Recent Labs     05/13/23  1342   SODIUM 140   POTASSIUM 4.2   CHLORIDE 107   CO2 20   GLUCOSE 98   BUN 8   CREATININE 0.84   CALCIUM 8.3*     Recent Labs     05/13/23  1342   ALTSGPT 27   ASTSGOT 26   ALKPHOSPHAT 91   TBILIRUBIN 0.5   LIPASE 17   GLUCOSE 98         No results for input(s): NTPROBNP in the last 72 hours.      No results for input(s): TROPONINT in the last 72 hours.    Imaging:  OUTSIDE IMAGES-CT ABDOMEN /PELVIS   Final Result          no X-Ray or EKG requiring interpretation    Assessment/Plan:  Justification for Admission Status  I anticipate this patient will require at least two midnights for appropriate medical management, necessitating inpatient admission because pyelonephritis, nephrolithiasis, possible need for surgical procedure    Patient will need a Med/Surg bed on SURGICAL service .  The need is secondary to  pyelonephritis.    * Pyelonephritis- (present on admission)  Assessment & Plan  IV Ciprofloxacin  Follow cultures  IVF hydration  Check Blood cultures, lactic acid panel    Nephrolithiasis- (present on admission)  Assessment & Plan  Pain control  Urology consulted  NPO MN    Anemia- (present on admission)  Assessment & Plan  Follow cbc        VTE prophylaxis: SCDs/TEDs

## 2023-05-13 NOTE — ED NOTES
Attempted to call report. Per primary RN, 50Deb is a male and pt needs to be in a female room. Primary RN updated charge.

## 2023-05-13 NOTE — ED NOTES
Med Rec complete per patient  No oral antibiotics in the last 30 days per patient  Allergies reviewed  Preferred Pharmacy: [facility] in [place]  Patient denies any prescription medications in the last 30 days

## 2023-05-13 NOTE — ASSESSMENT & PLAN NOTE
Patient denies any active bleeding or melena  Check iron studies, folate, B12 and TSH  Monitor CBC, transfuse for hemoglobin less than 7

## 2023-05-14 LAB
ALBUMIN SERPL BCP-MCNC: 2.5 G/DL (ref 3.2–4.9)
ALBUMIN/GLOB SERPL: 1.3 G/DL
ALP SERPL-CCNC: 65 U/L (ref 30–99)
ALT SERPL-CCNC: 23 U/L (ref 2–50)
ANION GAP SERPL CALC-SCNC: 6 MMOL/L (ref 7–16)
AST SERPL-CCNC: 34 U/L (ref 12–45)
BILIRUB SERPL-MCNC: 0.2 MG/DL (ref 0.1–1.5)
BUN SERPL-MCNC: 9 MG/DL (ref 8–22)
CALCIUM ALBUM COR SERPL-MCNC: 7.5 MG/DL (ref 8.5–10.5)
CALCIUM SERPL-MCNC: 6.3 MG/DL (ref 8.5–10.5)
CHLORIDE SERPL-SCNC: 118 MMOL/L (ref 96–112)
CO2 SERPL-SCNC: 18 MMOL/L (ref 20–33)
CREAT SERPL-MCNC: 0.43 MG/DL (ref 0.5–1.4)
ERYTHROCYTE [DISTWIDTH] IN BLOOD BY AUTOMATED COUNT: 44.4 FL (ref 35.9–50)
FOLATE SERPL-MCNC: 10 NG/ML
GFR SERPLBLD CREATININE-BSD FMLA CKD-EPI: 130 ML/MIN/1.73 M 2
GLOBULIN SER CALC-MCNC: 1.9 G/DL (ref 1.9–3.5)
GLUCOSE SERPL-MCNC: 88 MG/DL (ref 65–99)
HCT VFR BLD AUTO: 25.1 % (ref 37–47)
HGB BLD-MCNC: 7.7 G/DL (ref 12–16)
IRON SATN MFR SERPL: 6 % (ref 15–55)
IRON SERPL-MCNC: 16 UG/DL (ref 40–170)
LACTATE SERPL-SCNC: 0.5 MMOL/L (ref 0.5–2)
MCH RBC QN AUTO: 26.3 PG (ref 27–33)
MCHC RBC AUTO-ENTMCNC: 30.7 G/DL (ref 33.6–35)
MCV RBC AUTO: 85.7 FL (ref 81.4–97.8)
PLATELET # BLD AUTO: 129 K/UL (ref 164–446)
PMV BLD AUTO: 10.3 FL (ref 9–12.9)
POTASSIUM SERPL-SCNC: 3.4 MMOL/L (ref 3.6–5.5)
PROT SERPL-MCNC: 4.4 G/DL (ref 6–8.2)
RBC # BLD AUTO: 2.93 M/UL (ref 4.2–5.4)
SODIUM SERPL-SCNC: 142 MMOL/L (ref 135–145)
TIBC SERPL-MCNC: 264 UG/DL (ref 250–450)
TSH SERPL DL<=0.005 MIU/L-ACNC: 2.3 UIU/ML (ref 0.38–5.33)
UIBC SERPL-MCNC: 248 UG/DL (ref 110–370)
VIT B12 SERPL-MCNC: 304 PG/ML (ref 211–911)
WBC # BLD AUTO: 3.2 K/UL (ref 4.8–10.8)

## 2023-05-14 PROCEDURE — 85027 COMPLETE CBC AUTOMATED: CPT

## 2023-05-14 PROCEDURE — A9270 NON-COVERED ITEM OR SERVICE: HCPCS | Performed by: INTERNAL MEDICINE

## 2023-05-14 PROCEDURE — 770006 HCHG ROOM/CARE - MED/SURG/GYN SEMI*

## 2023-05-14 PROCEDURE — 700111 HCHG RX REV CODE 636 W/ 250 OVERRIDE (IP): Performed by: INTERNAL MEDICINE

## 2023-05-14 PROCEDURE — 700102 HCHG RX REV CODE 250 W/ 637 OVERRIDE(OP): Performed by: INTERNAL MEDICINE

## 2023-05-14 PROCEDURE — 700105 HCHG RX REV CODE 258: Performed by: FAMILY MEDICINE

## 2023-05-14 PROCEDURE — 700111 HCHG RX REV CODE 636 W/ 250 OVERRIDE (IP): Performed by: FAMILY MEDICINE

## 2023-05-14 PROCEDURE — A9270 NON-COVERED ITEM OR SERVICE: HCPCS | Performed by: FAMILY MEDICINE

## 2023-05-14 PROCEDURE — 83550 IRON BINDING TEST: CPT

## 2023-05-14 PROCEDURE — 700111 HCHG RX REV CODE 636 W/ 250 OVERRIDE (IP)

## 2023-05-14 PROCEDURE — 82607 VITAMIN B-12: CPT

## 2023-05-14 PROCEDURE — 80053 COMPREHEN METABOLIC PANEL: CPT

## 2023-05-14 PROCEDURE — 99232 SBSQ HOSP IP/OBS MODERATE 35: CPT | Performed by: INTERNAL MEDICINE

## 2023-05-14 PROCEDURE — 82746 ASSAY OF FOLIC ACID SERUM: CPT

## 2023-05-14 PROCEDURE — 700102 HCHG RX REV CODE 250 W/ 637 OVERRIDE(OP): Performed by: FAMILY MEDICINE

## 2023-05-14 PROCEDURE — 84443 ASSAY THYROID STIM HORMONE: CPT

## 2023-05-14 PROCEDURE — 83605 ASSAY OF LACTIC ACID: CPT

## 2023-05-14 PROCEDURE — 83540 ASSAY OF IRON: CPT

## 2023-05-14 RX ORDER — FERROUS SULFATE 325(65) MG
325 TABLET ORAL 2 TIMES DAILY WITH MEALS
Status: DISCONTINUED | OUTPATIENT
Start: 2023-05-14 | End: 2023-05-15 | Stop reason: HOSPADM

## 2023-05-14 RX ORDER — CALCIUM GLUCONATE 20 MG/ML
2 INJECTION, SOLUTION INTRAVENOUS ONCE
Status: COMPLETED | OUTPATIENT
Start: 2023-05-14 | End: 2023-05-14

## 2023-05-14 RX ORDER — KETOROLAC TROMETHAMINE 30 MG/ML
15 INJECTION, SOLUTION INTRAMUSCULAR; INTRAVENOUS EVERY 6 HOURS PRN
Status: DISCONTINUED | OUTPATIENT
Start: 2023-05-14 | End: 2023-05-15 | Stop reason: HOSPADM

## 2023-05-14 RX ADMIN — FERROUS SULFATE TAB 325 MG (65 MG ELEMENTAL FE) 325 MG: 325 (65 FE) TAB at 13:42

## 2023-05-14 RX ADMIN — OXYCODONE HYDROCHLORIDE 10 MG: 10 TABLET ORAL at 05:06

## 2023-05-14 RX ADMIN — SENNOSIDES AND DOCUSATE SODIUM 2 TABLET: 50; 8.6 TABLET ORAL at 05:06

## 2023-05-14 RX ADMIN — ACETAMINOPHEN 650 MG: 325 TABLET, FILM COATED ORAL at 10:50

## 2023-05-14 RX ADMIN — ONDANSETRON 4 MG: 4 TABLET, ORALLY DISINTEGRATING ORAL at 18:04

## 2023-05-14 RX ADMIN — ACETAMINOPHEN 650 MG: 325 TABLET, FILM COATED ORAL at 02:03

## 2023-05-14 RX ADMIN — CIPROFLOXACIN 400 MG: 2 INJECTION, SOLUTION INTRAVENOUS at 05:08

## 2023-05-14 RX ADMIN — KETOROLAC TROMETHAMINE 15 MG: 30 INJECTION, SOLUTION INTRAMUSCULAR; INTRAVENOUS at 21:30

## 2023-05-14 RX ADMIN — SODIUM CHLORIDE: 9 INJECTION, SOLUTION INTRAVENOUS at 01:53

## 2023-05-14 RX ADMIN — CALCIUM GLUCONATE 2 G: 20 INJECTION, SOLUTION INTRAVENOUS at 06:18

## 2023-05-14 RX ADMIN — KETOROLAC TROMETHAMINE 15 MG: 30 INJECTION, SOLUTION INTRAMUSCULAR; INTRAVENOUS at 15:12

## 2023-05-14 RX ADMIN — ONDANSETRON 4 MG: 4 TABLET, ORALLY DISINTEGRATING ORAL at 06:32

## 2023-05-14 RX ADMIN — CIPROFLOXACIN 400 MG: 2 INJECTION, SOLUTION INTRAVENOUS at 17:13

## 2023-05-14 ASSESSMENT — PAIN DESCRIPTION - PAIN TYPE
TYPE: ACUTE PAIN

## 2023-05-14 NOTE — PROGRESS NOTES
Assumed care of patient at 1700. Patient is A&O x4, declines pain at this time. Bed locked in lowest position with 2 rail up. Call light  in place, belongings at bedside. Hourly rounding is in place.

## 2023-05-14 NOTE — CONSULTS
UROLOGY Consult Note:    SANTO Haas  Date & Time note created:    5/14/2023   10:50 AM       Patient ID:   Name:             Jeanne Juarez   YOB: 1990  Age:                 32 y.o.  female   MRN:               [id number]                                                             Reason for Consult:      Pyelonephritis, non obstructing renal stones.    History of Present Illness:    This is a 33 yo F who presented 5/13 to the emergency department with L flank pain, with associated chills, dysuria, hematuria, nausea, vomiting. She was initially seen at a outside facility where a CT was obtained which showed left renal stones, mild hydronephrosis, but no obstructing stone. Patient is currently receiving cipro.  She does admit a history of stones, but denies prior urologic workup or intervention.  At the time of the consult, she reports of pain has improved, but still present. It is well controlled.  She admits some nausea, but no vomiting. WBC 3.2. Cr 0.43.  She is afebrile.    Review of Systems:      Constitutional: See HPI  Eyes: Denies changes in vision, no eye pain  Ears/Nose/Throat/Mouth: Denies nasal congestion or sore throat   Cardiovascular: no chest pain, no palpitations   Respiratory: no shortness of breath , Denies cough  Gastrointestinal/Hepatic: See HPI  Genitourinary: See HPI  Musculoskeletal/Rheum: Denies  joint pain and swelling, no edema  Skin: Denies rash  Neurological: Denies headache, confusion, memory loss or focal weakness/parasthesias  Psychiatric: denies mood disorder   Endocrine: Tania thyroid problems  Heme/Oncology/Lymph Nodes: Denies enlarged lymph nodes, denies brusing or known bleeding disorder  All other systems were reviewed and are negative (AMA/CMS criteria)                Past Medical History:   Past Medical History:   Diagnosis Date    Dental abscess 2016    Pt reports it constricted airway and she was hospitalized for 3 days, not intubated    Hip  fracture (HCC)     Left    Pregnancy      Active Hospital Problems    Diagnosis     Pyelonephritis [N12]     Nephrolithiasis [N20.0]     Anemia [D64.9]        Past Surgical History:  Past Surgical History:   Procedure Laterality Date    TUBAL LIGATION  2017       Hospital Medications:    Current Facility-Administered Medications:     senna-docusate (PERICOLACE or SENOKOT S) 8.6-50 MG per tablet 2 Tablet, 2 Tablet, Oral, BID, 2 Tablet at 05/14/23 0506 **AND** polyethylene glycol/lytes (MIRALAX) PACKET 1 Packet, 1 Packet, Oral, QDAY PRN **AND** magnesium hydroxide (MILK OF MAGNESIA) suspension 30 mL, 30 mL, Oral, QDAY PRN **AND** bisacodyl (DULCOLAX) suppository 10 mg, 10 mg, Rectal, QDAY PRN, Frankie Llamas M.D.    NS infusion, , Intravenous, Continuous, Frankie Llamas M.D., Last Rate: 150 mL/hr at 05/14/23 0153, New Bag at 05/14/23 0153    acetaminophen (Tylenol) tablet 650 mg, 650 mg, Oral, Q6HRS PRN, Frankie Llamas M.D., 650 mg at 05/14/23 0203    oxyCODONE immediate-release (ROXICODONE) tablet 5 mg, 5 mg, Oral, Q3HRS PRN, 5 mg at 05/13/23 1616 **OR** oxyCODONE immediate release (ROXICODONE) tablet 10 mg, 10 mg, Oral, Q3HRS PRN, 10 mg at 05/14/23 0506 **OR** morphine 4 MG/ML injection 4 mg, 4 mg, Intravenous, Q3HRS PRN, Frankie Llamas M.D.    ondansetron (ZOFRAN) syringe/vial injection 4 mg, 4 mg, Intravenous, Q4HRS PRN, Frankie Llamas M.D.    ondansetron (ZOFRAN ODT) dispertab 4 mg, 4 mg, Oral, Q4HRS PRN, Frankie Llamas M.D., 4 mg at 05/14/23 0632    promethazine (PHENERGAN) tablet 12.5-25 mg, 12.5-25 mg, Oral, Q4HRS PRN, Frankie Llamas M.D.    promethazine (PHENERGAN) suppository 12.5-25 mg, 12.5-25 mg, Rectal, Q4HRS PRN, Frankie Llamas M.D.    prochlorperazine (COMPAZINE) injection 5-10 mg, 5-10 mg, Intravenous, Q4HRS PRN, Frankie Llamas M.D.    ciprofloxacin (CIPRO) 400 mg in 200 mL D5W IVPB (premix), 400 mg, Intravenous, Q12HRS, Frankie Llamas M.D., Stopped at 05/14/23  "0608    Current Outpatient Medications:  Medications Prior to Admission   Medication Sig Dispense Refill Last Dose    ibuprofen (MOTRIN) 200 MG Tab Take 600 mg by mouth every 6 hours as needed for Mild Pain.   5/12/2023 at PM    acetaminophen (TYLENOL) 325 MG Tab Take 650 mg by mouth every 6 hours as needed for Mild Pain.   5/12/2023 at PM       Medication Allergy:  Allergies   Allergen Reactions    Amoxicillin Anaphylaxis    Penicillins Anaphylaxis    Keflex [Cephalexin] Rash             Family History:  Family History   Problem Relation Age of Onset    Breast Cancer Maternal Grandmother     Diabetes Maternal Grandfather     Cancer Paternal Grandfather        Social History:  Social History     Socioeconomic History    Marital status: Single     Spouse name: Not on file    Number of children: Not on file    Years of education: Not on file    Highest education level: Not on file   Occupational History     Employer: [employer]   Tobacco Use    Smoking status: Never    Smokeless tobacco: Never   Substance and Sexual Activity    Alcohol use: No    Drug use: No    Sexual activity: Not Currently     Birth control/protection: Surgical   Other Topics Concern    Not on file   Social History Narrative    Pt lives with her three children and the children's father in [place].      Social Determinants of Health     Financial Resource Strain: Not on file   Food Insecurity: Not on file   Transportation Needs: Not on file   Physical Activity: Not on file   Stress: Not on file   Social Connections: Not on file   Intimate Partner Violence: Not on file   Housing Stability: Not on file         Physical Exam:  Vitals/ General Appearance:   Weight/BMI: Body mass index is 22.49 kg/m².  /66   Pulse 70   Temp 36.2 °C (97.2 °F) (Temporal)   Resp 16   Ht 1.727 m (5' 8\")   Wt 67.1 kg (147 lb 14.9 oz)   SpO2 98%   Vitals:    05/13/23 2114 05/13/23 2259 05/14/23 0422 05/14/23 0901   BP: 90/54 92/55 93/59 100/66   Pulse: 68 81 60 " 70   Resp:  18 16 16   Temp:  36.4 °C (97.5 °F) 36.4 °C (97.5 °F) 36.2 °C (97.2 °F)   TempSrc:  Temporal Temporal Temporal   SpO2: 97% 98% 97% 98%   Weight:       Height:         Oxygen Therapy:  Pulse Oximetry: 98 %, O2 (LPM): 0, O2 Delivery Device: None - Room Air    Constitutional:   No acute distress  HENMT:  Normocephalic, Atraumatic  Eyes:  EOMI  Neck:  Normal range of motion  Lungs:  Normal respiratory effort   Abdomen: Soft, No tenderness, No guarding, No rebound tenderness.  : No CVAT  Skin: Warm, Dry  Neurologic: Alert & oriented x 3  Psychiatric: Affect normal, Judgment normal, Mood normal.      MDM (Data Review):     Records reviewed and summarized in current documentation    Lab Data Review:  Recent Results (from the past 24 hour(s))   CBC WITH DIFFERENTIAL    Collection Time: 05/13/23  1:42 PM   Result Value Ref Range    WBC 7.0 4.8 - 10.8 K/uL    RBC 3.80 (L) 4.20 - 5.40 M/uL    Hemoglobin 10.1 (L) 12.0 - 16.0 g/dL    Hematocrit 31.5 (L) 37.0 - 47.0 %    MCV 82.9 81.4 - 97.8 fL    MCH 26.6 (L) 27.0 - 33.0 pg    MCHC 32.1 (L) 33.6 - 35.0 g/dL    RDW 42.6 35.9 - 50.0 fL    Platelet Count 187 164 - 446 K/uL    MPV 10.3 9.0 - 12.9 fL    Neutrophils-Polys 78.00 (H) 44.00 - 72.00 %    Lymphocytes 12.20 (L) 22.00 - 41.00 %    Monocytes 9.20 0.00 - 13.40 %    Eosinophils 0.00 0.00 - 6.90 %    Basophils 0.30 0.00 - 1.80 %    Immature Granulocytes 0.30 0.00 - 0.90 %    Nucleated RBC 0.00 /100 WBC    Neutrophils (Absolute) 5.42 2.00 - 7.15 K/uL    Lymphs (Absolute) 0.85 (L) 1.00 - 4.80 K/uL    Monos (Absolute) 0.64 0.00 - 0.85 K/uL    Eos (Absolute) 0.00 0.00 - 0.51 K/uL    Baso (Absolute) 0.02 0.00 - 0.12 K/uL    Immature Granulocytes (abs) 0.02 0.00 - 0.11 K/uL    NRBC (Absolute) 0.00 K/uL   COMP METABOLIC PANEL    Collection Time: 05/13/23  1:42 PM   Result Value Ref Range    Sodium 140 135 - 145 mmol/L    Potassium 4.2 3.6 - 5.5 mmol/L    Chloride 107 96 - 112 mmol/L    Co2 20 20 - 33 mmol/L    Anion Gap  13.0 7.0 - 16.0    Glucose 98 65 - 99 mg/dL    Bun 8 8 - 22 mg/dL    Creatinine 0.84 0.50 - 1.40 mg/dL    Calcium 8.3 (L) 8.5 - 10.5 mg/dL    AST(SGOT) 26 12 - 45 U/L    ALT(SGPT) 27 2 - 50 U/L    Alkaline Phosphatase 91 30 - 99 U/L    Total Bilirubin 0.5 0.1 - 1.5 mg/dL    Albumin 4.0 3.2 - 4.9 g/dL    Total Protein 6.6 6.0 - 8.2 g/dL    Globulin 2.6 1.9 - 3.5 g/dL    A-G Ratio 1.5 g/dL   LIPASE    Collection Time: 05/13/23  1:42 PM   Result Value Ref Range    Lipase 17 11 - 82 U/L   HCG QUAL SERUM    Collection Time: 05/13/23  1:42 PM   Result Value Ref Range    Beta-Hcg Qualitative Serum Negative Negative   CORRECTED CALCIUM    Collection Time: 05/13/23  1:42 PM   Result Value Ref Range    Correct Calcium 8.3 (L) 8.5 - 10.5 mg/dL   ESTIMATED GFR    Collection Time: 05/13/23  1:42 PM   Result Value Ref Range    GFR (CKD-EPI) 93 >60 mL/min/1.73 m 2   URINALYSIS,CULTURE IF INDICATED    Collection Time: 05/13/23  1:50 PM    Specimen: Urine   Result Value Ref Range    Color Yellow     Character Cloudy (A)     Specific Gravity 1.043 <1.035    Ph 5.5 5.0 - 8.0    Glucose Negative Negative mg/dL    Ketones 40 (A) Negative mg/dL    Protein 30 (A) Negative mg/dL    Bilirubin Negative Negative    Urobilinogen, Urine 1.0 Negative    Nitrite Negative Negative    Leukocyte Esterase Moderate (A) Negative    Occult Blood Moderate (A) Negative    Micro Urine Req Microscopic    URINE MICROSCOPIC (W/UA)    Collection Time: 05/13/23  1:50 PM   Result Value Ref Range    WBC Packed (A) /hpf    RBC 5-10 (A) /hpf    Bacteria Negative None /hpf    Epithelial Cells Negative /hpf   BLOOD CULTURE    Collection Time: 05/13/23  3:36 PM    Specimen: Peripheral; Blood   Result Value Ref Range    Significant Indicator NEG     Source BLD     Site PERIPHERAL     Culture Result       No Growth  Note: Blood cultures are incubated for 5 days and  are monitored continuously.Positive blood cultures  are called to the RN and reported as soon as  they  are identified.     LACTIC ACID    Collection Time: 05/13/23  3:36 PM   Result Value Ref Range    Lactic Acid 0.6 0.5 - 2.0 mmol/L   BLOOD CULTURE    Collection Time: 05/13/23  3:57 PM    Specimen: Peripheral; Blood   Result Value Ref Range    Significant Indicator NEG     Source BLD     Site PERIPHERAL     Culture Result       No Growth  Note: Blood cultures are incubated for 5 days and  are monitored continuously.Positive blood cultures  are called to the RN and reported as soon as  they are identified.     LACTIC ACID    Collection Time: 05/13/23  7:18 PM   Result Value Ref Range    Lactic Acid 0.9 0.5 - 2.0 mmol/L   LACTIC ACID    Collection Time: 05/14/23  3:49 AM   Result Value Ref Range    Lactic Acid 0.5 0.5 - 2.0 mmol/L   CBC without Differential    Collection Time: 05/14/23  3:49 AM   Result Value Ref Range    WBC 3.2 (L) 4.8 - 10.8 K/uL    RBC 2.93 (L) 4.20 - 5.40 M/uL    Hemoglobin 7.7 (L) 12.0 - 16.0 g/dL    Hematocrit 25.1 (L) 37.0 - 47.0 %    MCV 85.7 81.4 - 97.8 fL    MCH 26.3 (L) 27.0 - 33.0 pg    MCHC 30.7 (L) 33.6 - 35.0 g/dL    RDW 44.4 35.9 - 50.0 fL    Platelet Count 129 (L) 164 - 446 K/uL    MPV 10.3 9.0 - 12.9 fL   Comp Metabolic Panel (CMP)    Collection Time: 05/14/23  3:49 AM   Result Value Ref Range    Sodium 142 135 - 145 mmol/L    Potassium 3.4 (L) 3.6 - 5.5 mmol/L    Chloride 118 (H) 96 - 112 mmol/L    Co2 18 (L) 20 - 33 mmol/L    Anion Gap 6.0 (L) 7.0 - 16.0    Glucose 88 65 - 99 mg/dL    Bun 9 8 - 22 mg/dL    Creatinine 0.43 (L) 0.50 - 1.40 mg/dL    Calcium 6.3 (LL) 8.5 - 10.5 mg/dL    AST(SGOT) 34 12 - 45 U/L    ALT(SGPT) 23 2 - 50 U/L    Alkaline Phosphatase 65 30 - 99 U/L    Total Bilirubin 0.2 0.1 - 1.5 mg/dL    Albumin 2.5 (L) 3.2 - 4.9 g/dL    Total Protein 4.4 (L) 6.0 - 8.2 g/dL    Globulin 1.9 1.9 - 3.5 g/dL    A-G Ratio 1.3 g/dL   CORRECTED CALCIUM    Collection Time: 05/14/23  3:49 AM   Result Value Ref Range    Correct Calcium 7.5 (L) 8.5 - 10.5 mg/dL   ESTIMATED GFR     Collection Time: 05/14/23  3:49 AM   Result Value Ref Range    GFR (CKD-EPI) 130 >60 mL/min/1.73 m 2   IRON/TOTAL IRON BIND    Collection Time: 05/14/23  9:55 AM   Result Value Ref Range    Iron 16 (L) 40 - 170 ug/dL    Total Iron Binding 264 250 - 450 ug/dL    Unsat Iron Binding 248 110 - 370 ug/dL    % Saturation 6 (L) 15 - 55 %   VITAMIN B12    Collection Time: 05/14/23  9:55 AM   Result Value Ref Range    Vitamin B12 -True Cobalamin 304 211 - 911 pg/mL   TSH WITH REFLEX TO FT4    Collection Time: 05/14/23  9:55 AM   Result Value Ref Range    TSH 2.300 0.380 - 5.330 uIU/mL   FOLATE    Collection Time: 05/14/23  9:55 AM   Result Value Ref Range    Folate -Folic Acid 10.0 >4.0 ng/mL       Imaging/Procedures Review:    Reviewed    MDM (Assessment and Plan):     Active Hospital Problems    Diagnosis     Pyelonephritis [N12]     Nephrolithiasis [N20.0]     Anemia [D64.9]       31 yo F with L flank pain, pyelonephritis, and multiple non obstructing L renal stone.   Plan:  - continue supportive care  - continue antibiotics, await culture results  - stone is non obstructing, and patient is improving while treating for pyelonephritis. Recommend to continue to treat for pyelonephritis.   - urology will continue to follow, and monitor patients progress with treatment for pyelonephritis.  If continues to improve can be seen as a outpatient for management/workup of non obstructing stones.    Dr. Rodriguez is aware of this consult.

## 2023-05-14 NOTE — PROGRESS NOTES
Received report and assumed care of patient at change of shift. Patient is A&Ox4, on RA, and reports no pain at this time. Patient assessment completed, bed in lowest position, and call light and personal belongings are within reach. Patient expressed no further needs at this time.

## 2023-05-14 NOTE — PROGRESS NOTES
Hospital Medicine Daily Progress Note    Date of Service  5/14/2023    Chief Complaint  Jeanne Juarez is a 32 y.o. female admitted 5/13/2023 with flank pain    Hospital Course  32-year-old female who presented with bilateral flank pain, chills and dysuria.  CT renal at an outlying facility apparently revealed left nephrolithiasis with mild left hydronephrosis but no obstructing stone.    Interval Problem Update  Patient continues to report malaise and fatigue with flank pain.  Her white count is down to 3.2, hemoglobin down to 7.7 and platelets are low at 129.  I have ordered iron panel, B12 and folate.  Patient's calcium was low at 6.3 and received a dose of IV calcium gluconate.  Her potassium is low at 3.4, replaced orally.  She continues to have anion gap metabolic acidosis.  Continue IV fluid hydration.  Continue IV ciprofloxacin and follow blood and urine cultures    I have discussed this patient's plan of care and discharge plan at IDT rounds today with Case Management, Nursing, Nursing leadership, and other members of the IDT team.    Consultants/Specialty  urology    Code Status  Full Code    Disposition  The patient is not medically cleared for discharge to home or a post-acute facility.  Anticipate discharge to: home with close outpatient follow-up    I have placed the appropriate orders for post-discharge needs.    Review of Systems  ROS     Physical Exam  Temp:  [36.2 °C (97.2 °F)-37.9 °C (100.3 °F)] 36.2 °C (97.2 °F)  Pulse:  [] 70  Resp:  [16-18] 16  BP: ()/(53-66) 100/66  SpO2:  [92 %-98 %] 98 %    Physical Exam    Fluids  No intake or output data in the 24 hours ending 05/14/23 1312    Laboratory  Recent Labs     05/13/23  1342 05/14/23  0349   WBC 7.0 3.2*   RBC 3.80* 2.93*   HEMOGLOBIN 10.1* 7.7*   HEMATOCRIT 31.5* 25.1*   MCV 82.9 85.7   MCH 26.6* 26.3*   MCHC 32.1* 30.7*   RDW 42.6 44.4   PLATELETCT 187 129*   MPV 10.3 10.3     Recent Labs     05/13/23  1342 05/14/23  0344    SODIUM 140 142   POTASSIUM 4.2 3.4*   CHLORIDE 107 118*   CO2 20 18*   GLUCOSE 98 88   BUN 8 9   CREATININE 0.84 0.43*   CALCIUM 8.3* 6.3*                   Imaging  OUTSIDE IMAGES-CT ABDOMEN /PELVIS   Final Result           Assessment/Plan  * Pyelonephritis- (present on admission)  Assessment & Plan  IV Ciprofloxacin  Follow cultures  IVF hydration  Check Blood cultures, lactic acid panel    Anemia- (present on admission)  Assessment & Plan  Patient denies any active bleeding or melena  Check iron studies, folate, B12 and TSH  Monitor CBC, transfuse for hemoglobin less than 7      Nephrolithiasis- (present on admission)  Assessment & Plan  Pain control  Urology consulted           VTE prophylaxis: SCDs/TEDs    I have performed a physical exam and reviewed and updated ROS and Plan today (5/14/2023). In review of yesterday's note (5/13/2023), there are no changes except as documented above.

## 2023-05-14 NOTE — CARE PLAN
The patient is Watcher - Medium risk of patient condition declining or worsening    Shift Goals  Clinical Goals: Urology consult.  Patient Goals: pain control    Progress made toward(s) clinical / shift goals:  Patient verbalizes understanding of plan of care; analgesics provided as per MAR.    Problem: Pain - Standard  Goal: Alleviation of pain or a reduction in pain to the patient’s comfort goal  Outcome: Progressing     Problem: Knowledge Deficit - Standard  Goal: Patient and family/care givers will demonstrate understanding of plan of care, disease process/condition, diagnostic tests and medications  Outcome: Progressing       Patient is not progressing towards the following goals:

## 2023-05-14 NOTE — PROGRESS NOTES
NOC HOSPITALIST CROSS COVER    Notified by RN regarding critical calcium of 6.3, corrected of 7.5.      Vitals:    05/14/23 0422   BP: 93/59   Pulse: 60   Resp: 16   Temp: 36.4 °C (97.5 °F)   SpO2: 97%          Plan:  #Hypocalcemia  -Calcium gluconate 2 g IV once        -----------------------------------------------------------------------------------------------------------    Electronically signed by:  Cuong Marley, ELADIO, APRN, DBP-BC  Hospitalist Services

## 2023-05-14 NOTE — PROGRESS NOTES
4 Eyes Skin Assessment Completed by JUAN M Jalloh and JUAN M Santiago.    Head WDL  Ears WDL  Nose WDL  Mouth WDL  Neck WDL  Breast/Chest WDL  Shoulder Blades WDL  Spine WDL  (R) Arm/Elbow/Hand WDL  (L) Arm/Elbow/Hand WDL  Abdomen WDL  Groin WDL  Scrotum/Coccyx/Buttocks WDL  (R) Leg WDL  (L) Leg WDL  (R) Heel/Foot/Toe WDL  (L) Heel/Foot/Toe WDL          Devices In Places Pulse Ox      Interventions In Place N/A    Possible Skin Injury No    Pictures Uploaded Into Epic N/A  Wound Consult Placed N/A  RN Wound Prevention Protocol Ordered No

## 2023-05-14 NOTE — PROGRESS NOTES
Received report from day shift RN. On assessment patient AAOX4, states pain is tolerable, but states feeling nauseated and dizzy; BP 90/54; medication for nausea provided; provider notified, orders in for a fluids bolus, bed locked and in low position, call bell within reach will continue to monitor.

## 2023-05-15 VITALS
DIASTOLIC BLOOD PRESSURE: 59 MMHG | RESPIRATION RATE: 17 BRPM | OXYGEN SATURATION: 97 % | TEMPERATURE: 98.6 F | HEIGHT: 68 IN | WEIGHT: 147.93 LBS | HEART RATE: 76 BPM | BODY MASS INDEX: 22.42 KG/M2 | SYSTOLIC BLOOD PRESSURE: 101 MMHG

## 2023-05-15 PROBLEM — N12 PYELONEPHRITIS: Status: RESOLVED | Noted: 2023-05-13 | Resolved: 2023-05-15

## 2023-05-15 LAB
ANION GAP SERPL CALC-SCNC: 12 MMOL/L (ref 7–16)
BACTERIA UR CULT: NORMAL
BUN SERPL-MCNC: 6 MG/DL (ref 8–22)
CALCIUM SERPL-MCNC: 8.2 MG/DL (ref 8.5–10.5)
CHLORIDE SERPL-SCNC: 104 MMOL/L (ref 96–112)
CO2 SERPL-SCNC: 21 MMOL/L (ref 20–33)
CREAT SERPL-MCNC: 0.62 MG/DL (ref 0.5–1.4)
ERYTHROCYTE [DISTWIDTH] IN BLOOD BY AUTOMATED COUNT: 43.3 FL (ref 35.9–50)
GFR SERPLBLD CREATININE-BSD FMLA CKD-EPI: 119 ML/MIN/1.73 M 2
GLUCOSE SERPL-MCNC: 101 MG/DL (ref 65–99)
HCT VFR BLD AUTO: 27.7 % (ref 37–47)
HGB BLD-MCNC: 9 G/DL (ref 12–16)
MCH RBC QN AUTO: 27.1 PG (ref 27–33)
MCHC RBC AUTO-ENTMCNC: 32.5 G/DL (ref 33.6–35)
MCV RBC AUTO: 83.4 FL (ref 81.4–97.8)
PLATELET # BLD AUTO: 187 K/UL (ref 164–446)
PMV BLD AUTO: 10.7 FL (ref 9–12.9)
POTASSIUM SERPL-SCNC: 3.8 MMOL/L (ref 3.6–5.5)
RBC # BLD AUTO: 3.32 M/UL (ref 4.2–5.4)
SIGNIFICANT IND 70042: NORMAL
SITE SITE: NORMAL
SODIUM SERPL-SCNC: 137 MMOL/L (ref 135–145)
SOURCE SOURCE: NORMAL
WBC # BLD AUTO: 4.2 K/UL (ref 4.8–10.8)

## 2023-05-15 PROCEDURE — 700111 HCHG RX REV CODE 636 W/ 250 OVERRIDE (IP): Performed by: FAMILY MEDICINE

## 2023-05-15 PROCEDURE — 80048 BASIC METABOLIC PNL TOTAL CA: CPT

## 2023-05-15 PROCEDURE — 700111 HCHG RX REV CODE 636 W/ 250 OVERRIDE (IP): Performed by: INTERNAL MEDICINE

## 2023-05-15 PROCEDURE — 700102 HCHG RX REV CODE 250 W/ 637 OVERRIDE(OP): Performed by: FAMILY MEDICINE

## 2023-05-15 PROCEDURE — 700105 HCHG RX REV CODE 258: Performed by: FAMILY MEDICINE

## 2023-05-15 PROCEDURE — 700102 HCHG RX REV CODE 250 W/ 637 OVERRIDE(OP): Performed by: INTERNAL MEDICINE

## 2023-05-15 PROCEDURE — 85027 COMPLETE CBC AUTOMATED: CPT

## 2023-05-15 PROCEDURE — A9270 NON-COVERED ITEM OR SERVICE: HCPCS | Performed by: INTERNAL MEDICINE

## 2023-05-15 PROCEDURE — A9270 NON-COVERED ITEM OR SERVICE: HCPCS | Performed by: FAMILY MEDICINE

## 2023-05-15 PROCEDURE — 99239 HOSP IP/OBS DSCHRG MGMT >30: CPT | Performed by: INTERNAL MEDICINE

## 2023-05-15 RX ORDER — CIPROFLOXACIN 500 MG/1
500 TABLET, FILM COATED ORAL 2 TIMES DAILY
Qty: 10 TABLET | Refills: 0 | Status: ACTIVE | OUTPATIENT
Start: 2023-05-15 | End: 2023-05-20

## 2023-05-15 RX ORDER — CIPROFLOXACIN 500 MG/1
500 TABLET, FILM COATED ORAL 2 TIMES DAILY
Qty: 14 TABLET | Refills: 0 | Status: SHIPPED | OUTPATIENT
Start: 2023-05-15 | End: 2023-05-15 | Stop reason: SDUPTHER

## 2023-05-15 RX ORDER — FERROUS SULFATE 325(65) MG
325 TABLET ORAL 2 TIMES DAILY WITH MEALS
Qty: 60 TABLET | Refills: 0 | Status: SHIPPED | OUTPATIENT
Start: 2023-05-15

## 2023-05-15 RX ADMIN — CIPROFLOXACIN 400 MG: 2 INJECTION, SOLUTION INTRAVENOUS at 05:18

## 2023-05-15 RX ADMIN — KETOROLAC TROMETHAMINE 15 MG: 30 INJECTION, SOLUTION INTRAMUSCULAR; INTRAVENOUS at 09:49

## 2023-05-15 RX ADMIN — ONDANSETRON 4 MG: 2 INJECTION INTRAMUSCULAR; INTRAVENOUS at 03:06

## 2023-05-15 RX ADMIN — SODIUM CHLORIDE: 9 INJECTION, SOLUTION INTRAVENOUS at 05:11

## 2023-05-15 RX ADMIN — SENNOSIDES AND DOCUSATE SODIUM 2 TABLET: 50; 8.6 TABLET ORAL at 05:19

## 2023-05-15 RX ADMIN — FERROUS SULFATE TAB 325 MG (65 MG ELEMENTAL FE) 325 MG: 325 (65 FE) TAB at 08:50

## 2023-05-15 ASSESSMENT — ENCOUNTER SYMPTOMS
CHILLS: 0
COUGH: 0
FEVER: 0
FLANK PAIN: 0
NAUSEA: 0
ABDOMINAL PAIN: 0
VOMITING: 0

## 2023-05-15 NOTE — PROGRESS NOTES
Note to reader: this note follows the APSO format rather than the historical SOAP format. Assessment and plan located at the top of the note for ease of use.    Chief Complaint  32 y.o. year old female here with Flank Pain      Assessment/Plan  Interval History   Active Hospital Problems    Diagnosis     Nephrolithiasis [N20.0]     Anemia [D64.9]      5/15. Sitting up in bed in NAD. Pain resolved, states she feels much better than yesterday. Urine and blood cx NGTD, on IV cipro. Labs stable, AFVSS.    Plan:  - Pt cleared to discharge from urology perspective. Recommend at least 7d PO abx upon discharge  - [facility] will contact patient to arrange outpatient follow up regarding non-obstructive L renal stones  - No further urologic intervention anticipated during this admission. Urology signing off, please call with questions.       Discussed with Dr Baetty, who has directed this patient's plan of care.    Review of Systems  Physical Exam   Review of Systems   Constitutional:  Negative for chills and fever.   Respiratory:  Negative for cough.    Cardiovascular:  Negative for chest pain.   Gastrointestinal:  Negative for abdominal pain, nausea and vomiting.   Genitourinary:  Negative for flank pain.   All other systems reviewed and are negative.    Vitals:    05/14/23 1655 05/14/23 1925 05/15/23 0418 05/15/23 0800   BP: 96/63 111/72 94/46 101/59   Pulse: 72 77 66 76   Resp: 16 18 17 17   Temp: 36.4 °C (97.5 °F) 36.2 °C (97.1 °F) 36.2 °C (97.1 °F) 37 °C (98.6 °F)   TempSrc: Temporal Temporal Temporal Temporal   SpO2: 96% 96% 99% 97%   Weight:       Height:         Physical Exam  Vitals and nursing note reviewed.   Constitutional:       General: She is not in acute distress.  HENT:      Head: Normocephalic.      Nose: Nose normal.      Mouth/Throat:      Pharynx: Oropharynx is clear.   Eyes:      Conjunctiva/sclera: Conjunctivae normal.   Pulmonary:      Effort: Pulmonary effort is normal.   Abdominal:       General: There is no distension.      Palpations: Abdomen is soft.   Musculoskeletal:         General: Normal range of motion.      Cervical back: Normal range of motion and neck supple.   Skin:     General: Skin is warm and dry.   Neurological:      General: No focal deficit present.      Mental Status: She is alert and oriented to person, place, and time.   Psychiatric:         Mood and Affect: Mood normal.         Behavior: Behavior normal.          Hematology Chemistry   Lab Results   Component Value Date/Time    WBC 4.2 (L) 05/15/2023 02:51 AM    HEMOGLOBIN 9.0 (L) 05/15/2023 02:51 AM    HEMATOCRIT 27.7 (L) 05/15/2023 02:51 AM    PLATELETCT 187 05/15/2023 02:51 AM     Lab Results   Component Value Date/Time    SODIUM 137 05/15/2023 02:51 AM    POTASSIUM 3.8 05/15/2023 02:51 AM    CHLORIDE 104 05/15/2023 02:51 AM    CO2 21 05/15/2023 02:51 AM    GLUCOSE 101 (H) 05/15/2023 02:51 AM    BUN 6 (L) 05/15/2023 02:51 AM    CREATININE 0.62 05/15/2023 02:51 AM         Labs not explicitly included in this progress note were reviewed by the author.   Radiology/imaging not explicitly included in this progress note was reviewed by the author.     Radiology images reviewed, Labs reviewed and Medications reviewed

## 2023-05-15 NOTE — DISCHARGE SUMMARY
Discharge Summary    CHIEF COMPLAINT ON ADMISSION  Chief Complaint   Patient presents with    Flank Pain       Reason for Admission  Transfer     Admission Date  5/13/2023    CODE STATUS  Full Code    HPI & HOSPITAL COURSE  32-year-old female who presented with bilateral flank pain, chills and dysuria.  CT renal at an outlying facility apparently revealed left nephrolithiasis with mild left hydronephrosis but no obstructing stone.  Patient was diagnosed with pyelonephritis and was started on IV ciprofloxacin.  Her urine cultures and blood cultures remain negative.  She was also noted to be anemic.  Iron panel returned positive for iron deficiency and she was started on oral iron supplementation.  She was evaluated by urology that recommended no surgical treatment and recommended a course of 7-day of antibiotics.  Patient is to follow-up with urology in clinic.    Therefore, she is discharged in good and stable condition to home with close outpatient follow-up.    The patient met 2-midnight criteria for an inpatient stay at the time of discharge.    Discharge Date  5/15/23    FOLLOW UP ITEMS POST DISCHARGE  Follow-up with urology    DISCHARGE DIAGNOSES  Principal Problem (Resolved):    Pyelonephritis (POA: Yes)  Active Problems:    Nephrolithiasis (POA: Yes)    Anemia (POA: Yes)      FOLLOW UP  No future appointments.  No follow-up provider specified.    MEDICATIONS ON DISCHARGE     Medication List        START taking these medications        Instructions   ciprofloxacin 500 MG Tabs  Commonly known as: CIPRO   Take 1 Tablet by mouth 2 times a day for 7 days.  Dose: 500 mg     ferrous sulfate 325 (65 Fe) MG tablet   Take 1 Tablet by mouth 2 times a day with meals.  Dose: 325 mg            CONTINUE taking these medications        Instructions   acetaminophen 325 MG Tabs  Commonly known as: Tylenol   Take 650 mg by mouth every 6 hours as needed for Mild Pain.  Dose: 650 mg     ibuprofen 200 MG Tabs  Commonly known as:  MOTRIN   Take 600 mg by mouth every 6 hours as needed for Mild Pain.  Dose: 600 mg              Allergies  Allergies   Allergen Reactions    Amoxicillin Anaphylaxis    Penicillins Anaphylaxis    Keflex [Cephalexin] Rash             DIET  Orders Placed This Encounter   Procedures    Diet Order Diet: Regular     Standing Status:   Standing     Number of Occurrences:   1     Order Specific Question:   Diet:     Answer:   Regular [1]       ACTIVITY  As tolerated.  Weight bearing as tolerated    CONSULTATIONS  Urology Dr. Beatty    PROCEDURES  None    LABORATORY  Lab Results   Component Value Date    SODIUM 137 05/15/2023    POTASSIUM 3.8 05/15/2023    CHLORIDE 104 05/15/2023    CO2 21 05/15/2023    GLUCOSE 101 (H) 05/15/2023    BUN 6 (L) 05/15/2023    CREATININE 0.62 05/15/2023        Lab Results   Component Value Date    WBC 4.2 (L) 05/15/2023    HEMOGLOBIN 9.0 (L) 05/15/2023    HEMATOCRIT 27.7 (L) 05/15/2023    PLATELETCT 187 05/15/2023        Total time of the discharge process exceeds 35 minutes.

## 2023-05-15 NOTE — CARE PLAN
The patient is Watcher - Medium risk of patient condition declining or worsening    Shift Goals  Clinical Goals: IV ABX,  Patient Goals: comfort    Progress made toward(s) clinical / shift goals:  patient verbalizes understanding of plan of care. Analgesics provided as per MAR. Patient states pain is tolerable after administration.    Problem: Pain - Standard  Goal: Alleviation of pain or a reduction in pain to the patient’s comfort goal  Outcome: Progressing     Problem: Knowledge Deficit - Standard  Goal: Patient and family/care givers will demonstrate understanding of plan of care, disease process/condition, diagnostic tests and medications  Outcome: Progressing       Patient is not progressing towards the following goals:

## 2023-05-15 NOTE — PROGRESS NOTES
Received report from day shift RN. On assessment patient [id number], patient requesting asistance with shower, same provided by CNA; ice water provided as per request, bed locked and in low position, call bell within reach.

## 2023-05-15 NOTE — PROGRESS NOTES
Patients IV removed. Patient educated and repeat back understanding. Patient signed all documents with complete understanding.  No home meds.
